# Patient Record
Sex: FEMALE | Race: WHITE | HISPANIC OR LATINO | Employment: FULL TIME | URBAN - METROPOLITAN AREA
[De-identification: names, ages, dates, MRNs, and addresses within clinical notes are randomized per-mention and may not be internally consistent; named-entity substitution may affect disease eponyms.]

---

## 2017-06-20 ENCOUNTER — OFFICE VISIT (OUTPATIENT)
Dept: URGENT CARE | Facility: PHYSICIAN GROUP | Age: 27
End: 2017-06-20
Payer: COMMERCIAL

## 2017-06-20 VITALS
HEART RATE: 78 BPM | BODY MASS INDEX: 23.98 KG/M2 | RESPIRATION RATE: 18 BRPM | SYSTOLIC BLOOD PRESSURE: 124 MMHG | WEIGHT: 127 LBS | OXYGEN SATURATION: 97 % | DIASTOLIC BLOOD PRESSURE: 74 MMHG | HEIGHT: 61 IN | TEMPERATURE: 98.7 F

## 2017-06-20 DIAGNOSIS — L02.91 ABSCESS: ICD-10-CM

## 2017-06-20 PROCEDURE — 99214 OFFICE O/P EST MOD 30 MIN: CPT | Performed by: FAMILY MEDICINE

## 2017-06-20 RX ORDER — SULFAMETHOXAZOLE AND TRIMETHOPRIM 800; 160 MG/1; MG/1
1 TABLET ORAL 2 TIMES DAILY
Qty: 20 TAB | Refills: 0 | Status: SHIPPED | OUTPATIENT
Start: 2017-06-20 | End: 2017-06-30

## 2017-06-20 RX ORDER — NORETHINDRONE ACETATE AND ETHINYL ESTRADIOL 1MG-20(21)
1 KIT ORAL
Refills: 3 | Status: ON HOLD | COMMUNITY
Start: 2017-04-04 | End: 2020-05-13

## 2017-06-20 ASSESSMENT — ENCOUNTER SYMPTOMS
BLURRED VISION: 0
FEVER: 0
EYE REDNESS: 0
EYE DISCHARGE: 0
NAUSEA: 0
VOMITING: 0

## 2017-06-20 NOTE — MR AVS SNAPSHOT
"        Kenya Estrada   2017 9:45 AM   Office Visit   MRN: [id number]    Department:  [facility]   Dept Phone:  [phone]    Description:  Female : 1990   Provider:  Jesus Berumen M.D.           Reason for Visit     Eye Problem C/o cyst under RT eye x2 wks.  PT states it feels solid, with no discharge.  PT was sick before noticing the cyst.      Allergies as of 2017     No Known Allergies      You were diagnosed with     Abscess   [[id number]]         Vital Signs     Blood Pressure Pulse Temperature Respirations Height Weight    124/74 mmHg 78 37.1 °C (98.7 °F) 18 1.549 m (5' 0.98\") 57.607 kg (127 lb)    Body Mass Index Oxygen Saturation Smoking Status             24.01 kg/m2 97% Never Smoker          Basic Information     Date Of Birth Sex Race Ethnicity Preferred Language    1990 Female Unable to Obtain Unknown English      Health Maintenance        Date Due Completion Dates    IMM DTaP/Tdap/Td Vaccine (1 - Tdap) 2007 ---    PAP SMEAR 2019            Current Immunizations     Influenza Vaccine Quad Inj (Pf) 10/15/2016  9:20 AM      Below and/or attached are the medications your provider expects you to take. Review all of your home medications and newly ordered medications with your provider and/or pharmacist. Follow medication instructions as directed by your provider and/or pharmacist. Please keep your medication list with you and share with your provider. Update the information when medications are discontinued, doses are changed, or new medications (including over-the-counter products) are added; and carry medication information at all times in the event of emergency situations     Allergies:  No Known Allergies          Medications  Valid as of: 2017 - 10:15 AM    Generic Name Brand Name Tablet Size Instructions for use    Albuterol Sulfate (Aero Soln) albuterol 108 (90 BASE) MCG/ACT Inhale 2 Puffs by mouth every four hours as needed for " Shortness of Breath.        Loratadine   Take  by mouth.        Norethin Ace-Eth Estrad-FE (Tab) BLISOVI FE 1/20 1-20 MG-MCG 1 Tab every day.        Promethazine-Codeine (Syrup) PHENERGAN-CODEINE 6.25-10 MG/5ML Take 5 mL by mouth 4 times a day as needed for Cough.        Pseudoeph-Doxylamine-DM-APAP   Take  by mouth.        Pseudoephedrine-APAP-DM   Take  by mouth.        Sulfamethoxazole-Trimethoprim (Tab) BACTRIM -160 MG Take 1 Tab by mouth 2 times a day for 10 days.        .                 Medicines prescribed today were sent to:     [facility][id number] - [place] - [address]    [address] [place] [address]    Phone: [phone] Fax: [phone]    Open 24 Hours?: No      Medication refill instructions:       If your prescription bottle indicates you have medication refills left, it is not necessary to call your provider’s office. Please contact your pharmacy and they will refill your medication.    If your prescription bottle indicates you do not have any refills left, you may request refills at any time through one of the following ways: The online ID [employer] system (except Urgent Care), by calling your provider’s office, or by asking your pharmacy to contact your provider’s office with a refill request. Medication refills are processed only during regular business hours and may not be available until the next business day. Your provider may request additional information or to have a follow-up visit with you prior to refilling your medication.   *Please Note: Medication refills are assigned a new Rx number when refilled electronically. Your pharmacy may indicate that no refills were authorized even though a new prescription for the same medication is available at the pharmacy. Please request the medicine by name with the pharmacy before contacting your provider for a refill.        Instructions    Abscess  An abscess is an infected area that contains a collection of pus and debris. It can occur in  almost any part of the body. An abscess is also known as a furuncle or boil.  CAUSES   An abscess occurs when tissue gets infected. This can occur from blockage of oil or sweat glands, infection of hair follicles, or a minor injury to the skin. As the body tries to fight the infection, pus collects in the area and creates pressure under the skin. This pressure causes pain. People with weakened immune systems have difficulty fighting infections and get certain abscesses more often.   SYMPTOMS  Usually an abscess develops on the skin and becomes a painful mass that is red, warm, and tender. If the abscess forms under the skin, you may feel a moveable soft area under the skin. Some abscesses break open (rupture) on their own, but most will continue to get worse without care. The infection can spread deeper into the body and eventually into the bloodstream, causing you to feel ill.   DIAGNOSIS   Your caregiver will take your medical history and perform a physical exam. A sample of fluid may also be taken from the abscess to determine what is causing your infection.  TREATMENT   Your caregiver may prescribe antibiotic medicines to fight the infection. However, taking antibiotics alone usually does not cure an abscess. Your caregiver may need to make a small cut (incision) in the abscess to drain the pus. In some cases, gauze is packed into the abscess to reduce pain and to continue draining the area.  HOME CARE INSTRUCTIONS   · Only take over-the-counter or prescription medicines for pain, discomfort, or fever as directed by your caregiver.  · If you were prescribed antibiotics, take them as directed. Finish them even if you start to feel better.  · If gauze is used, follow your caregiver's directions for changing the gauze.  · To avoid spreading the infection:  ¨ Keep your draining abscess covered with a bandage.  ¨ Wash your hands well.  ¨ Do not share personal care items, towels, or whirlpools with others.  ¨ Avoid  skin contact with others.  · Keep your skin and clothes clean around the abscess.  · Keep all follow-up appointments as directed by your caregiver.  SEEK MEDICAL CARE IF:   · You have increased pain, swelling, redness, fluid drainage, or bleeding.  · You have muscle aches, chills, or a general ill feeling.  · You have a fever.  MAKE SURE YOU:   · Understand these instructions.  · Will watch your condition.  · Will get help right away if you are not doing well or get worse.     This information is not intended to replace advice given to you by your health care provider. Make sure you discuss any questions you have with your health care provider.     Document Released: 09/27/2006 Document Revised: 06/18/2013 Document Reviewed: 03/01/2013  [employer] Interactive Patient Education ©2016 Elsevier Inc.            Wandrian Access Code: [email or web address]  Expires: 7/20/2017 10:15 AM    Wandrian  A secure, online tool to manage your health information     [employer]’s Wandrian® is a secure, online tool that connects you to your personalized health information from the privacy of your home -- day or night - making it very easy for you to manage your healthcare. Once the activation process is completed, you can even access your medical information using the Wandrian raj, which is available for free in the Apple Raj store or Google Play store.     Wandrian provides the following levels of access (as shown below):   My Chart Features   [facility]  Specialists [facility]  Urgent  Care Non-[facility]  Primary Care  Doctor   Email your healthcare team securely and privately 24/7 X X X    Manage appointments: schedule your next appointment; view details of past/upcoming appointments X      Request prescription refills. X      View recent personal medical records, including lab and immunizations X X X X   View health record, including health history, allergies, medications X X X X   Read reports about your outpatient  visits, procedures, consult and ER notes X X X X   See your discharge summary, which is a recap of your hospital and/or ER visit that includes your diagnosis, lab results, and care plan. X X       How to register for SimpleSite:  1. Go to  [email or web address].  2. Click on the Sign Up Now box, which takes you to the New Member Sign Up page. You will need to provide the following information:  a. Enter your SimpleSite Access Code exactly as it appears at the top of this page. (You will not need to use this code after you’ve completed the sign-up process. If you do not sign up before the expiration date, you must request a new code.)   b. Enter your date of birth.   c. Enter your home email address.   d. Click Submit, and follow the next screen’s instructions.  3. Create a SimpleSite ID. This will be your [employer] login ID and cannot be changed, so think of one that is secure and easy to remember.  4. Create a SimpleSite password. You can change your password at any time.  5. Enter your Password Reset Question and Answer. This can be used at a later time if you forget your password.   6. Enter your e-mail address. This allows you to receive e-mail notifications when new information is available in SimpleSite.  7. Click Sign Up. You can now view your health information.    For assistance activating your SimpleSite account, call [phone]

## 2017-06-20 NOTE — PROGRESS NOTES
"Subjective:      Kenya Estrada is a 27 y.o. female who presents with Eye Problem            Eye Problem   The left eye is affected. This is a new problem. The current episode started 1 to 4 weeks ago. The problem occurs constantly. The problem has been rapidly worsening. There was no injury mechanism. The pain is severe. Pertinent negatives include no blurred vision, eye discharge, eye redness, fever, itching, nausea or vomiting.       Review of Systems   Constitutional: Negative for fever.   Eyes: Negative for blurred vision, discharge and redness.   Gastrointestinal: Negative for nausea and vomiting.   Skin: Negative for itching.     No Known Allergies      Objective:     /74 mmHg  Pulse 78  Temp(Src) 37.1 °C (98.7 °F)  Resp 18  Ht 1.549 m (5' 0.98\")  Wt 57.607 kg (127 lb)  BMI 24.01 kg/m2  SpO2 97%     Physical Exam   Constitutional: She is oriented to person, place, and time. She appears well-developed and well-nourished. No distress.   HENT:   Head: Normocephalic and atraumatic.       Eyes: Conjunctivae and EOM are normal. Pupils are equal, round, and reactive to light.   Cardiovascular: Normal rate and regular rhythm.    No murmur heard.  Pulmonary/Chest: Effort normal and breath sounds normal. No respiratory distress.   Abdominal: Soft. She exhibits no distension. There is no tenderness.   Neurological: She is alert and oriented to person, place, and time. She has normal reflexes. No sensory deficit.   Skin: Skin is warm and dry.   Psychiatric: She has a normal mood and affect.               Assessment/Plan:     1. Abscess  Differential diagnosis, natural history, supportive care, and indications for immediate follow-up discussed.   - sulfamethoxazole-trimethoprim (BACTRIM DS) 800-160 MG tablet; Take 1 Tab by mouth 2 times a day for 10 days.  Dispense: 20 Tab; Refill: 0        "

## 2017-07-12 ENCOUNTER — HOSPITAL ENCOUNTER (OUTPATIENT)
Facility: MEDICAL CENTER | Age: 27
End: 2017-07-12
Attending: OBSTETRICS & GYNECOLOGY
Payer: COMMERCIAL

## 2017-07-12 LAB — CYTOLOGY REG CYTOL: NORMAL

## 2017-07-12 PROCEDURE — 88175 CYTOPATH C/V AUTO FLUID REDO: CPT

## 2017-09-16 ENCOUNTER — HOSPITAL ENCOUNTER (OUTPATIENT)
Facility: MEDICAL CENTER | Age: 27
End: 2017-09-16
Payer: COMMERCIAL

## 2017-09-16 LAB
ALBUMIN SERPL BCP-MCNC: 4.4 G/DL (ref 3.2–4.9)
ALBUMIN/GLOB SERPL: 1.3 G/DL
ALP SERPL-CCNC: 47 U/L (ref 30–99)
ALT SERPL-CCNC: 11 U/L (ref 2–50)
ANION GAP SERPL CALC-SCNC: 11 MMOL/L (ref 0–11.9)
AST SERPL-CCNC: 18 U/L (ref 12–45)
BDY FAT % MEASURED: 28.7 %
BILIRUB SERPL-MCNC: 0.3 MG/DL (ref 0.1–1.5)
BP DIAS: 70 MMHG
BP SYS: 118 MMHG
BUN SERPL-MCNC: 18 MG/DL (ref 8–22)
CALCIUM SERPL-MCNC: 9.4 MG/DL (ref 8.5–10.5)
CHLORIDE SERPL-SCNC: 104 MMOL/L (ref 96–112)
CHOLEST SERPL-MCNC: 208 MG/DL (ref 100–199)
CO2 SERPL-SCNC: 24 MMOL/L (ref 20–33)
CREAT SERPL-MCNC: 0.76 MG/DL (ref 0.5–1.4)
DIABETES HTDIA: NO
EVENT NAME HTEVT: NORMAL
GFR SERPL CREATININE-BSD FRML MDRD: >60 ML/MIN/1.73 M 2
GLOBULIN SER CALC-MCNC: 3.5 G/DL (ref 1.9–3.5)
GLUCOSE SERPL-MCNC: 94 MG/DL (ref 65–99)
HDLC SERPL-MCNC: 55 MG/DL
HYPERTENSION HTHYP: NO
LDLC SERPL CALC-MCNC: 131 MG/DL
POTASSIUM SERPL-SCNC: 4 MMOL/L (ref 3.6–5.5)
PROT SERPL-MCNC: 7.9 G/DL (ref 6–8.2)
SCREENING LOC CITY HTCIT: NORMAL
SCREENING LOC STATE HTSTA: NORMAL
SCREENING LOCATION HTLOC: NORMAL
SODIUM SERPL-SCNC: 139 MMOL/L (ref 135–145)
SUBSCRIBER ID HTSID: NORMAL
TRIGL SERPL-MCNC: 111 MG/DL (ref 0–149)

## 2017-12-28 ENCOUNTER — OFFICE VISIT (OUTPATIENT)
Dept: INTERNAL MEDICINE | Facility: MEDICAL CENTER | Age: 27
End: 2017-12-28
Payer: COMMERCIAL

## 2017-12-28 VITALS
HEIGHT: 60 IN | TEMPERATURE: 98.3 F | BODY MASS INDEX: 24.76 KG/M2 | HEART RATE: 71 BPM | WEIGHT: 126.13 LBS | DIASTOLIC BLOOD PRESSURE: 78 MMHG | OXYGEN SATURATION: 100 % | SYSTOLIC BLOOD PRESSURE: 122 MMHG

## 2017-12-28 DIAGNOSIS — Z23 NEED FOR INFLUENZA VACCINATION: ICD-10-CM

## 2017-12-28 DIAGNOSIS — Z82.49 FAMILY HISTORY OF ISCHEMIC HEART DISEASE (IHD): ICD-10-CM

## 2017-12-28 DIAGNOSIS — Z00.00 HEALTH CARE MAINTENANCE: ICD-10-CM

## 2017-12-28 DIAGNOSIS — Z23 NEED FOR TD VACCINE: ICD-10-CM

## 2017-12-28 DIAGNOSIS — E78.5 DYSLIPIDEMIA: ICD-10-CM

## 2017-12-28 PROCEDURE — 99203 OFFICE O/P NEW LOW 30 MIN: CPT | Mod: 25,GC | Performed by: INTERNAL MEDICINE

## 2017-12-28 PROCEDURE — 90472 IMMUNIZATION ADMIN EACH ADD: CPT | Performed by: INTERNAL MEDICINE

## 2017-12-28 PROCEDURE — 90714 TD VACC NO PRESV 7 YRS+ IM: CPT | Performed by: INTERNAL MEDICINE

## 2017-12-28 PROCEDURE — 90471 IMMUNIZATION ADMIN: CPT | Performed by: INTERNAL MEDICINE

## 2017-12-28 PROCEDURE — 90686 IIV4 VACC NO PRSV 0.5 ML IM: CPT | Performed by: INTERNAL MEDICINE

## 2017-12-28 ASSESSMENT — PATIENT HEALTH QUESTIONNAIRE - PHQ9: CLINICAL INTERPRETATION OF PHQ2 SCORE: 0

## 2017-12-28 NOTE — PROGRESS NOTES
New Patient to Establish    Reason to establish: For vaccination    CC: Need for vaccination,    HPI:     27-year-old female with no significant past medical history except elevated LDL, intermittent cough, currently not on any medication except oral contraceptive pills came in today for vaccination for tetanus. She is a teacher and teaches grade 1 students and due to exposure t intermittently gets cough. Currently she denies any cough fever or shortness of breath. She has an episode of otitis media in 2016 and an episode of cellulitis in 2017. Currently she denies any symptoms. She is taking her oral contraceptive pills and is up-to-date with STD screening and complies with was 40 290 safe sexual practices.         Patient Active Problem List    Diagnosis Date Noted   • Dyslipidemia 12/28/2017   • Need for influenza vaccination 12/28/2017   • Need for tetanus booster 12/28/2017   • Need for TD vaccine 12/28/2017   • Health care maintenance 12/28/2017       No past medical history on file.    Current Outpatient Prescriptions   Medication Sig Dispense Refill   • BLISOVI FE 1/20 1-20 MG-MCG per tablet 1 Tab every day.  3     No current facility-administered medications for this visit.        Allergies as of 12/28/2017   • (No Known Allergies)       Social History     Social History   • Marital status: Unknown     Spouse name: N/A   • Number of children: N/A   • Years of education: N/A     Occupational History   • Not on file.     Social History Main Topics   • Smoking status: Never Smoker   • Smokeless tobacco: Not on file   • Alcohol use Not on file   • Drug use: Unknown   • Sexual activity: Not on file     Other Topics Concern   • Not on file     Social History Narrative   • No narrative on file     Smoking: Denies  Alcohol: Social  Drugs: Denies    Family history: Mother was diagnosed with meningioma, paternal grandfather had ischemic heart disease at age of 40    No past surgical history on file.    ROS: As per  "HPI. Additional pertinent symptoms as noted below.  General;Feels well, with stable,  GI: Denies nausea vomiting and abdominal discomfort  Genitourinary: Denies dysuria, hematuria, suprapubic discomfort  Neurological: Denies any focal weakness or sensory loss  Musculoskeletal: No arthritis, swelling or back pain  Lungs: Positive for chronic cough, not interested in quitting smoking, no fever  Psychiatric: Denies any suicidal or homicidal ideation   Cardiovascular: No chest pain orthopnea or PND  OB/GYN: Cycles regular, on contraceptive pills, no discharge dyspareunia or menorrhagia            /78   Pulse 71   Temp 36.8 °C (98.3 °F)   Ht 1.53 m (5' 0.25\")   Wt 57.2 kg (126 lb 2 oz)   SpO2 100%   BMI 24.43 kg/m²     Physical Exam  General:  Alert and oriented, No apparent distress.    Eyes: Pupils equal and reactive. No scleral icterus.    Throat: Clear no erythema or exudates noted.    Neck: Supple. No lymphadenopathy noted. Thyroid not enlarged.    Lungs: Clear to auscultation and percussion bilaterally.    Cardiovascular: Regular rate and rhythm. No murmurs, rubs or gallops.    Abdomen:  Benign. No rebound or guarding noted.    Extremities: No clubbing, cyanosis, edema.    Skin: Clear. No rash or suspicious skin lesions noted.      Note: I have reviewed all pertinent labs and diagnostic tests associated with this visit with specific comments listed under the assessment and plan below    Assessment and Plan    Dyslipidemia  Need for influenza vaccination  Need for TD vaccine  Health care maintenance  Family history of ischemic heart disease in young age    Will give tetanus booster and flu shot today  Due to persistent elevated LDL and family history of ischemic heart disease in young age at 40 and paternal grandfather: Will check glycohemoglobin  Educated about diet and exercise  Educated about safe sexual practices and STD prevention    Follow-up in one year    Followup: No Follow-up on file.    Risk " Assessment (discuss potential complications a function of chronic problems): yes    Complexity (discuss number of co-morbidities): 3    Signed by: Rob Olivares M.D.

## 2017-12-28 NOTE — NON-PROVIDER
"Kenya Estrada is a 27 y.o. female here for a non-provider visit for:   FLU    Reason for immunization: Overdue/Provider Recommended  Immunization records indicate need for vaccine: Yes, confirmed with Epic  Minimum interval has been met for this vaccine: Yes  ABN completed: Not Indicated    Order and dose verified by: Narda  VIS Dated  08/07/15 was given to patient: Yes  All IAC Questionnaire questions were answered \"No.\"    Patient tolerated injection and no adverse effects were observed or reported: Yes    Pt scheduled for next dose in series: Not Indicated    Kenya Estrada is a 27 y.o. female here for a non-provider visit for:   TD    Reason for immunization: Overdue/Provider Recommended  Immunization records indicate need for vaccine: Yes, confirmed with Epic  Minimum interval has been met for this vaccine: Yes  ABN completed: Not Indicated    Order and dose verified by: Narda  VIS Dated  04/11/17 was given to patient: Yes  All IAC Questionnaire questions were answered \"No.\"    Patient tolerated injection and no adverse effects were observed or reported: Yes    Pt scheduled for next dose in series: Not Indicated      "

## 2018-09-08 ENCOUNTER — HOSPITAL ENCOUNTER (OUTPATIENT)
Facility: MEDICAL CENTER | Age: 28
End: 2018-09-08
Payer: COMMERCIAL

## 2018-09-08 LAB
BDY FAT % MEASURED: 33.8 %
BP DIAS: 64 MMHG
BP SYS: 110 MMHG
DIABETES HTDIA: NO
EVENT NAME HTEVT: NORMAL
HYPERTENSION HTHYP: NO
SCREENING LOC CITY HTCIT: NORMAL
SCREENING LOC STATE HTSTA: NORMAL
SCREENING LOCATION HTLOC: NORMAL
SUBSCRIBER ID HTSID: NORMAL

## 2018-09-09 LAB
CHOLEST SERPL-MCNC: 234 MG/DL (ref 100–199)
FASTING STATUS PATIENT QL REPORTED: NORMAL
GLUCOSE SERPL-MCNC: 89 MG/DL (ref 65–99)
HDLC SERPL-MCNC: 55 MG/DL
LDLC SERPL CALC-MCNC: 148 MG/DL
TRIGL SERPL-MCNC: 157 MG/DL (ref 0–149)

## 2018-09-20 ENCOUNTER — HOSPITAL ENCOUNTER (OUTPATIENT)
Dept: LAB | Facility: MEDICAL CENTER | Age: 28
End: 2018-09-20
Attending: OBSTETRICS & GYNECOLOGY
Payer: COMMERCIAL

## 2018-09-20 PROCEDURE — 88175 CYTOPATH C/V AUTO FLUID REDO: CPT

## 2018-09-20 PROCEDURE — 87624 HPV HI-RISK TYP POOLED RSLT: CPT

## 2018-09-24 LAB
CYTOLOGY REG CYTOL: NORMAL
HPV HR 12 DNA CVX QL NAA+PROBE: NEGATIVE
HPV16 DNA SPEC QL NAA+PROBE: NEGATIVE
HPV18 DNA SPEC QL NAA+PROBE: NEGATIVE
SPECIMEN SOURCE: NORMAL

## 2019-09-13 ENCOUNTER — HOSPITAL ENCOUNTER (OUTPATIENT)
Dept: LAB | Facility: MEDICAL CENTER | Age: 29
End: 2019-09-13
Attending: OBSTETRICS & GYNECOLOGY
Payer: COMMERCIAL

## 2019-09-13 LAB
ABO GROUP BLD: NORMAL
B-HCG SERPL-ACNC: 1045.6 MIU/ML (ref 0–5)
BASOPHILS # BLD AUTO: 0.3 % (ref 0–1.8)
BASOPHILS # BLD: 0.03 K/UL (ref 0–0.12)
EOSINOPHIL # BLD AUTO: 0.06 K/UL (ref 0–0.51)
EOSINOPHIL NFR BLD: 0.6 % (ref 0–6.9)
ERYTHROCYTE [DISTWIDTH] IN BLOOD BY AUTOMATED COUNT: 42.8 FL (ref 35.9–50)
HCT VFR BLD AUTO: 41.2 % (ref 37–47)
HGB BLD-MCNC: 13.5 G/DL (ref 12–16)
IMM GRANULOCYTES # BLD AUTO: 0.03 K/UL (ref 0–0.11)
IMM GRANULOCYTES NFR BLD AUTO: 0.3 % (ref 0–0.9)
LYMPHOCYTES # BLD AUTO: 2.85 K/UL (ref 1–4.8)
LYMPHOCYTES NFR BLD: 29.2 % (ref 22–41)
MCH RBC QN AUTO: 29.6 PG (ref 27–33)
MCHC RBC AUTO-ENTMCNC: 32.8 G/DL (ref 33.6–35)
MCV RBC AUTO: 90.4 FL (ref 81.4–97.8)
MONOCYTES # BLD AUTO: 0.51 K/UL (ref 0–0.85)
MONOCYTES NFR BLD AUTO: 5.2 % (ref 0–13.4)
NEUTROPHILS # BLD AUTO: 6.27 K/UL (ref 2–7.15)
NEUTROPHILS NFR BLD: 64.4 % (ref 44–72)
NRBC # BLD AUTO: 0 K/UL
NRBC BLD-RTO: 0 /100 WBC
PLATELET # BLD AUTO: 244 K/UL (ref 164–446)
PMV BLD AUTO: 11 FL (ref 9–12.9)
PROGEST SERPL-MCNC: 24.14 NG/ML
RBC # BLD AUTO: 4.56 M/UL (ref 4.2–5.4)
RH BLD: NORMAL
WBC # BLD AUTO: 9.8 K/UL (ref 4.8–10.8)

## 2019-09-13 PROCEDURE — 85025 COMPLETE CBC W/AUTO DIFF WBC: CPT

## 2019-09-13 PROCEDURE — 36415 COLL VENOUS BLD VENIPUNCTURE: CPT

## 2019-09-13 PROCEDURE — 84144 ASSAY OF PROGESTERONE: CPT

## 2019-09-13 PROCEDURE — 86901 BLOOD TYPING SEROLOGIC RH(D): CPT

## 2019-09-13 PROCEDURE — 84702 CHORIONIC GONADOTROPIN TEST: CPT

## 2019-09-13 PROCEDURE — 86900 BLOOD TYPING SEROLOGIC ABO: CPT

## 2019-09-20 ENCOUNTER — HOSPITAL ENCOUNTER (OUTPATIENT)
Dept: LAB | Facility: MEDICAL CENTER | Age: 29
End: 2019-09-20
Attending: OBSTETRICS & GYNECOLOGY
Payer: COMMERCIAL

## 2019-09-20 LAB — B-HCG SERPL-ACNC: ABNORMAL MIU/ML (ref 0–5)

## 2019-09-20 PROCEDURE — 36415 COLL VENOUS BLD VENIPUNCTURE: CPT

## 2019-09-20 PROCEDURE — 84702 CHORIONIC GONADOTROPIN TEST: CPT

## 2019-10-29 ENCOUNTER — HOSPITAL ENCOUNTER (OUTPATIENT)
Dept: LAB | Facility: MEDICAL CENTER | Age: 29
End: 2019-10-29
Attending: OBSTETRICS & GYNECOLOGY
Payer: COMMERCIAL

## 2019-10-29 PROCEDURE — 87491 CHLMYD TRACH DNA AMP PROBE: CPT

## 2019-10-29 PROCEDURE — 88175 CYTOPATH C/V AUTO FLUID REDO: CPT

## 2019-10-29 PROCEDURE — 87591 N.GONORRHOEAE DNA AMP PROB: CPT

## 2019-10-29 PROCEDURE — 87624 HPV HI-RISK TYP POOLED RSLT: CPT

## 2019-10-31 LAB — AMBIGUOUS DTTM AMBI4: NORMAL

## 2019-11-01 ENCOUNTER — HOSPITAL ENCOUNTER (OUTPATIENT)
Dept: LAB | Facility: MEDICAL CENTER | Age: 29
End: 2019-11-01
Attending: OBSTETRICS & GYNECOLOGY
Payer: COMMERCIAL

## 2019-11-01 LAB
ABO GROUP BLD: NORMAL
APPEARANCE UR: ABNORMAL
BACTERIA #/AREA URNS HPF: ABNORMAL /HPF
BASOPHILS # BLD AUTO: 0.2 % (ref 0–1.8)
BASOPHILS # BLD: 0.02 K/UL (ref 0–0.12)
BILIRUB UR QL STRIP.AUTO: NEGATIVE
BLD GP AB SCN SERPL QL: NORMAL
C TRACH DNA GENITAL QL NAA+PROBE: NEGATIVE
COLOR UR: YELLOW
CYTOLOGY REG CYTOL: NORMAL
EOSINOPHIL # BLD AUTO: 0.1 K/UL (ref 0–0.51)
EOSINOPHIL NFR BLD: 1 % (ref 0–6.9)
EPI CELLS #/AREA URNS HPF: ABNORMAL /HPF
ERYTHROCYTE [DISTWIDTH] IN BLOOD BY AUTOMATED COUNT: 39.8 FL (ref 35.9–50)
GLUCOSE UR STRIP.AUTO-MCNC: NEGATIVE MG/DL
HBV SURFACE AG SER QL: NEGATIVE
HCT VFR BLD AUTO: 40.1 % (ref 37–47)
HCV AB SER QL: NEGATIVE
HGB BLD-MCNC: 13.7 G/DL (ref 12–16)
HIV 1+2 AB+HIV1 P24 AG SERPL QL IA: NON REACTIVE
HPV HR 12 DNA CVX QL NAA+PROBE: NEGATIVE
HPV16 DNA SPEC QL NAA+PROBE: NEGATIVE
HPV18 DNA SPEC QL NAA+PROBE: NEGATIVE
HYALINE CASTS #/AREA URNS LPF: ABNORMAL /LPF
IMM GRANULOCYTES # BLD AUTO: 0.07 K/UL (ref 0–0.11)
IMM GRANULOCYTES NFR BLD AUTO: 0.7 % (ref 0–0.9)
KETONES UR STRIP.AUTO-MCNC: NEGATIVE MG/DL
LEUKOCYTE ESTERASE UR QL STRIP.AUTO: ABNORMAL
LYMPHOCYTES # BLD AUTO: 2.87 K/UL (ref 1–4.8)
LYMPHOCYTES NFR BLD: 29.3 % (ref 22–41)
MCH RBC QN AUTO: 30.4 PG (ref 27–33)
MCHC RBC AUTO-ENTMCNC: 34.2 G/DL (ref 33.6–35)
MCV RBC AUTO: 89.1 FL (ref 81.4–97.8)
MICRO URNS: ABNORMAL
MONOCYTES # BLD AUTO: 0.53 K/UL (ref 0–0.85)
MONOCYTES NFR BLD AUTO: 5.4 % (ref 0–13.4)
MUCOUS THREADS #/AREA URNS HPF: ABNORMAL /HPF
N GONORRHOEA DNA GENITAL QL NAA+PROBE: NEGATIVE
NEUTROPHILS # BLD AUTO: 6.22 K/UL (ref 2–7.15)
NEUTROPHILS NFR BLD: 63.4 % (ref 44–72)
NITRITE UR QL STRIP.AUTO: NEGATIVE
NRBC # BLD AUTO: 0 K/UL
NRBC BLD-RTO: 0 /100 WBC
PH UR STRIP.AUTO: 6 [PH] (ref 5–8)
PLATELET # BLD AUTO: 284 K/UL (ref 164–446)
PMV BLD AUTO: 10.5 FL (ref 9–12.9)
PROT UR QL STRIP: NEGATIVE MG/DL
RBC # BLD AUTO: 4.5 M/UL (ref 4.2–5.4)
RBC # URNS HPF: ABNORMAL /HPF
RBC UR QL AUTO: NEGATIVE
RENAL EPI CELLS #/AREA URNS HPF: NEGATIVE /HPF
RH BLD: NORMAL
RUBV AB SER QL: 6.3 IU/ML
SP GR UR STRIP.AUTO: 1.02
SPECIMEN SOURCE: NORMAL
SPECIMEN SOURCE: NORMAL
TRANS CELLS #/AREA URNS HPF: NEGATIVE /HPF
TREPONEMA PALLIDUM IGG+IGM AB [PRESENCE] IN SERUM OR PLASMA BY IMMUNOASSAY: NON REACTIVE
UROBILINOGEN UR STRIP.AUTO-MCNC: 0.2 MG/DL
WBC # BLD AUTO: 9.8 K/UL (ref 4.8–10.8)
WBC #/AREA URNS HPF: ABNORMAL /HPF

## 2019-11-01 PROCEDURE — 81244 FMR1 GEN ALYS CHARAC ALLELES: CPT

## 2019-11-01 PROCEDURE — 86900 BLOOD TYPING SEROLOGIC ABO: CPT

## 2019-11-01 PROCEDURE — 81243 FMR1 GEN ALY DETC ABNL ALLEL: CPT

## 2019-11-01 PROCEDURE — 86901 BLOOD TYPING SEROLOGIC RH(D): CPT

## 2019-11-01 PROCEDURE — 87340 HEPATITIS B SURFACE AG IA: CPT

## 2019-11-01 PROCEDURE — 81001 URINALYSIS AUTO W/SCOPE: CPT

## 2019-11-01 PROCEDURE — 86762 RUBELLA ANTIBODY: CPT

## 2019-11-01 PROCEDURE — 81220 CFTR GENE COM VARIANTS: CPT

## 2019-11-01 PROCEDURE — 85025 COMPLETE CBC W/AUTO DIFF WBC: CPT

## 2019-11-01 PROCEDURE — 36415 COLL VENOUS BLD VENIPUNCTURE: CPT

## 2019-11-01 PROCEDURE — 86780 TREPONEMA PALLIDUM: CPT

## 2019-11-01 PROCEDURE — 87389 HIV-1 AG W/HIV-1&-2 AB AG IA: CPT

## 2019-11-01 PROCEDURE — 86803 HEPATITIS C AB TEST: CPT

## 2019-11-01 PROCEDURE — 86850 RBC ANTIBODY SCREEN: CPT

## 2019-11-06 LAB
CF EXPANDED VARIANT PANEL INTERP Q4864: NORMAL
CFTR ALLELE 1 BLD/T QL: NEGATIVE
CFTR ALLELE 1 BLD/T QL: NEGATIVE
CFTR MUT ANL BLD/T: NORMAL
FMR1 ALLELE 2 CGG RPT ENTNUM BLD/T: 20 CGG REPEATS
FMR1 ALLELE1 CGG RPT ENTNUM BLD/T: 29 CGG REPEATS
FMR1 GENE MUT ANL BLD/T: NORMAL
FMR1 GENE MUT ANL BLD/T: NORMAL

## 2020-02-11 ENCOUNTER — APPOINTMENT (OUTPATIENT)
Dept: OTHER | Facility: IMAGING CENTER | Age: 30
End: 2020-02-11

## 2020-02-15 ENCOUNTER — HOSPITAL ENCOUNTER (OUTPATIENT)
Dept: LAB | Facility: MEDICAL CENTER | Age: 30
End: 2020-02-15
Attending: OBSTETRICS & GYNECOLOGY
Payer: COMMERCIAL

## 2020-02-15 LAB
BASOPHILS # BLD AUTO: 0.6 % (ref 0–1.8)
BASOPHILS # BLD: 0.06 K/UL (ref 0–0.12)
EOSINOPHIL # BLD AUTO: 0.03 K/UL (ref 0–0.51)
EOSINOPHIL NFR BLD: 0.3 % (ref 0–6.9)
ERYTHROCYTE [DISTWIDTH] IN BLOOD BY AUTOMATED COUNT: 46.5 FL (ref 35.9–50)
GLUCOSE 1H P 50 G GLC PO SERPL-MCNC: 136 MG/DL (ref 70–139)
HCT VFR BLD AUTO: 39.8 % (ref 37–47)
HGB BLD-MCNC: 13.1 G/DL (ref 12–16)
IMM GRANULOCYTES # BLD AUTO: 0.11 K/UL (ref 0–0.11)
IMM GRANULOCYTES NFR BLD AUTO: 1.1 % (ref 0–0.9)
LYMPHOCYTES # BLD AUTO: 1.78 K/UL (ref 1–4.8)
LYMPHOCYTES NFR BLD: 17.3 % (ref 22–41)
MCH RBC QN AUTO: 31.5 PG (ref 27–33)
MCHC RBC AUTO-ENTMCNC: 32.9 G/DL (ref 33.6–35)
MCV RBC AUTO: 95.7 FL (ref 81.4–97.8)
MONOCYTES # BLD AUTO: 0.34 K/UL (ref 0–0.85)
MONOCYTES NFR BLD AUTO: 3.3 % (ref 0–13.4)
NEUTROPHILS # BLD AUTO: 7.98 K/UL (ref 2–7.15)
NEUTROPHILS NFR BLD: 77.4 % (ref 44–72)
NRBC # BLD AUTO: 0 K/UL
NRBC BLD-RTO: 0 /100 WBC
PLATELET # BLD AUTO: 230 K/UL (ref 164–446)
PMV BLD AUTO: 11 FL (ref 9–12.9)
RBC # BLD AUTO: 4.16 M/UL (ref 4.2–5.4)
TREPONEMA PALLIDUM IGG+IGM AB [PRESENCE] IN SERUM OR PLASMA BY IMMUNOASSAY: NON REACTIVE
WBC # BLD AUTO: 10.3 K/UL (ref 4.8–10.8)

## 2020-02-15 PROCEDURE — 36415 COLL VENOUS BLD VENIPUNCTURE: CPT

## 2020-02-15 PROCEDURE — 86780 TREPONEMA PALLIDUM: CPT

## 2020-02-15 PROCEDURE — 82950 GLUCOSE TEST: CPT

## 2020-02-15 PROCEDURE — 85025 COMPLETE CBC W/AUTO DIFF WBC: CPT

## 2020-02-18 ENCOUNTER — APPOINTMENT (OUTPATIENT)
Dept: OTHER | Facility: IMAGING CENTER | Age: 30
End: 2020-02-18

## 2020-02-25 ENCOUNTER — APPOINTMENT (OUTPATIENT)
Dept: OTHER | Facility: IMAGING CENTER | Age: 30
End: 2020-02-25

## 2020-03-03 ENCOUNTER — APPOINTMENT (OUTPATIENT)
Dept: OTHER | Facility: IMAGING CENTER | Age: 30
End: 2020-03-03

## 2020-03-10 ENCOUNTER — APPOINTMENT (OUTPATIENT)
Dept: OTHER | Facility: IMAGING CENTER | Age: 30
End: 2020-03-10
Payer: COMMERCIAL

## 2020-04-22 LAB — STREP GP B DNA PCR: NEGATIVE

## 2020-05-13 ENCOUNTER — HOSPITAL ENCOUNTER (INPATIENT)
Facility: MEDICAL CENTER | Age: 30
LOS: 2 days | End: 2020-05-15
Attending: OBSTETRICS & GYNECOLOGY | Admitting: OBSTETRICS & GYNECOLOGY
Payer: COMMERCIAL

## 2020-05-13 ENCOUNTER — ANESTHESIA EVENT (OUTPATIENT)
Dept: ANESTHESIOLOGY | Facility: MEDICAL CENTER | Age: 30
End: 2020-05-13
Payer: COMMERCIAL

## 2020-05-13 ENCOUNTER — ANESTHESIA (OUTPATIENT)
Dept: ANESTHESIOLOGY | Facility: MEDICAL CENTER | Age: 30
End: 2020-05-13
Payer: COMMERCIAL

## 2020-05-13 ENCOUNTER — APPOINTMENT (OUTPATIENT)
Dept: OBGYN | Facility: MEDICAL CENTER | Age: 30
End: 2020-05-13
Attending: OBSTETRICS & GYNECOLOGY
Payer: COMMERCIAL

## 2020-05-13 LAB
BASOPHILS # BLD AUTO: 0.3 % (ref 0–1.8)
BASOPHILS # BLD: 0.03 K/UL (ref 0–0.12)
COVID ORDER STATUS COVID19: NORMAL
EOSINOPHIL # BLD AUTO: 0.02 K/UL (ref 0–0.51)
EOSINOPHIL NFR BLD: 0.2 % (ref 0–6.9)
ERYTHROCYTE [DISTWIDTH] IN BLOOD BY AUTOMATED COUNT: 46.1 FL (ref 35.9–50)
HCT VFR BLD AUTO: 39 % (ref 37–47)
HGB BLD-MCNC: 13.4 G/DL (ref 12–16)
HOLDING TUBE BB 8507: NORMAL
IMM GRANULOCYTES # BLD AUTO: 0.09 K/UL (ref 0–0.11)
IMM GRANULOCYTES NFR BLD AUTO: 0.9 % (ref 0–0.9)
LYMPHOCYTES # BLD AUTO: 1.67 K/UL (ref 1–4.8)
LYMPHOCYTES NFR BLD: 16.4 % (ref 22–41)
MCH RBC QN AUTO: 30.2 PG (ref 27–33)
MCHC RBC AUTO-ENTMCNC: 34.4 G/DL (ref 33.6–35)
MCV RBC AUTO: 88 FL (ref 81.4–97.8)
MONOCYTES # BLD AUTO: 0.73 K/UL (ref 0–0.85)
MONOCYTES NFR BLD AUTO: 7.2 % (ref 0–13.4)
NEUTROPHILS # BLD AUTO: 7.65 K/UL (ref 2–7.15)
NEUTROPHILS NFR BLD: 75 % (ref 44–72)
NRBC # BLD AUTO: 0 K/UL
NRBC BLD-RTO: 0 /100 WBC
PLATELET # BLD AUTO: 205 K/UL (ref 164–446)
PMV BLD AUTO: 11.3 FL (ref 9–12.9)
RBC # BLD AUTO: 4.43 M/UL (ref 4.2–5.4)
SARS-COV-2 RNA RESP QL NAA+PROBE: NOTDETECTED
SPECIMEN SOURCE: NORMAL
WBC # BLD AUTO: 10.2 K/UL (ref 4.8–10.8)

## 2020-05-13 PROCEDURE — U0004 COV-19 TEST NON-CDC HGH THRU: HCPCS

## 2020-05-13 PROCEDURE — 700105 HCHG RX REV CODE 258: Performed by: OBSTETRICS & GYNECOLOGY

## 2020-05-13 PROCEDURE — 0KQM0ZZ REPAIR PERINEUM MUSCLE, OPEN APPROACH: ICD-10-PCS | Performed by: OBSTETRICS & GYNECOLOGY

## 2020-05-13 PROCEDURE — 59409 OBSTETRICAL CARE: CPT

## 2020-05-13 PROCEDURE — G2023 SPECIMEN COLLECT COVID-19: HCPCS | Performed by: OBSTETRICS & GYNECOLOGY

## 2020-05-13 PROCEDURE — 88307 TISSUE EXAM BY PATHOLOGIST: CPT

## 2020-05-13 PROCEDURE — 10907ZC DRAINAGE OF AMNIOTIC FLUID, THERAPEUTIC FROM PRODUCTS OF CONCEPTION, VIA NATURAL OR ARTIFICIAL OPENING: ICD-10-PCS | Performed by: OBSTETRICS & GYNECOLOGY

## 2020-05-13 PROCEDURE — 700101 HCHG RX REV CODE 250: Performed by: ANESTHESIOLOGY

## 2020-05-13 PROCEDURE — A9270 NON-COVERED ITEM OR SERVICE: HCPCS

## 2020-05-13 PROCEDURE — 36415 COLL VENOUS BLD VENIPUNCTURE: CPT

## 2020-05-13 PROCEDURE — 770002 HCHG ROOM/CARE - OB PRIVATE (112)

## 2020-05-13 PROCEDURE — 304965 HCHG RECOVERY SERVICES

## 2020-05-13 PROCEDURE — 700102 HCHG RX REV CODE 250 W/ 637 OVERRIDE(OP)

## 2020-05-13 PROCEDURE — 3E033VJ INTRODUCTION OF OTHER HORMONE INTO PERIPHERAL VEIN, PERCUTANEOUS APPROACH: ICD-10-PCS | Performed by: OBSTETRICS & GYNECOLOGY

## 2020-05-13 PROCEDURE — 700111 HCHG RX REV CODE 636 W/ 250 OVERRIDE (IP)

## 2020-05-13 PROCEDURE — 85025 COMPLETE CBC W/AUTO DIFF WBC: CPT

## 2020-05-13 PROCEDURE — 0UC97ZZ EXTIRPATION OF MATTER FROM UTERUS, VIA NATURAL OR ARTIFICIAL OPENING: ICD-10-PCS | Performed by: OBSTETRICS & GYNECOLOGY

## 2020-05-13 PROCEDURE — 700111 HCHG RX REV CODE 636 W/ 250 OVERRIDE (IP): Performed by: OBSTETRICS & GYNECOLOGY

## 2020-05-13 PROCEDURE — 10H07YZ INSERTION OF OTHER DEVICE INTO PRODUCTS OF CONCEPTION, VIA NATURAL OR ARTIFICIAL OPENING: ICD-10-PCS | Performed by: OBSTETRICS & GYNECOLOGY

## 2020-05-13 RX ORDER — METOCLOPRAMIDE HYDROCHLORIDE 5 MG/ML
INJECTION INTRAMUSCULAR; INTRAVENOUS
Status: COMPLETED
Start: 2020-05-13 | End: 2020-05-13

## 2020-05-13 RX ORDER — CITRIC ACID/SODIUM CITRATE 334-500MG
30 SOLUTION, ORAL ORAL ONCE
Status: DISCONTINUED | OUTPATIENT
Start: 2020-05-13 | End: 2020-05-14 | Stop reason: HOSPADM

## 2020-05-13 RX ORDER — MISOPROSTOL 200 UG/1
1000 TABLET ORAL
Status: COMPLETED | OUTPATIENT
Start: 2020-05-13 | End: 2020-05-13

## 2020-05-13 RX ORDER — OXYCODONE AND ACETAMINOPHEN 10; 325 MG/1; MG/1
1 TABLET ORAL EVERY 4 HOURS PRN
Status: DISCONTINUED | OUTPATIENT
Start: 2020-05-13 | End: 2020-05-15 | Stop reason: HOSPADM

## 2020-05-13 RX ORDER — ROPIVACAINE HYDROCHLORIDE 2 MG/ML
INJECTION, SOLUTION EPIDURAL; INFILTRATION; PERINEURAL
Status: COMPLETED
Start: 2020-05-13 | End: 2020-05-13

## 2020-05-13 RX ORDER — ONDANSETRON 2 MG/ML
4 INJECTION INTRAMUSCULAR; INTRAVENOUS EVERY 6 HOURS PRN
Status: DISCONTINUED | OUTPATIENT
Start: 2020-05-13 | End: 2020-05-15 | Stop reason: HOSPADM

## 2020-05-13 RX ORDER — OXYCODONE HYDROCHLORIDE AND ACETAMINOPHEN 5; 325 MG/1; MG/1
1 TABLET ORAL EVERY 4 HOURS PRN
Status: DISCONTINUED | OUTPATIENT
Start: 2020-05-13 | End: 2020-05-15 | Stop reason: HOSPADM

## 2020-05-13 RX ORDER — SODIUM CHLORIDE, SODIUM LACTATE, POTASSIUM CHLORIDE, CALCIUM CHLORIDE 600; 310; 30; 20 MG/100ML; MG/100ML; MG/100ML; MG/100ML
INJECTION, SOLUTION INTRAVENOUS CONTINUOUS
Status: DISPENSED | OUTPATIENT
Start: 2020-05-13 | End: 2020-05-13

## 2020-05-13 RX ORDER — ROPIVACAINE HYDROCHLORIDE 2 MG/ML
INJECTION, SOLUTION EPIDURAL; INFILTRATION; PERINEURAL CONTINUOUS
Status: DISCONTINUED | OUTPATIENT
Start: 2020-05-13 | End: 2020-05-15 | Stop reason: HOSPADM

## 2020-05-13 RX ORDER — MISOPROSTOL 200 UG/1
TABLET ORAL
Status: COMPLETED
Start: 2020-05-13 | End: 2020-05-13

## 2020-05-13 RX ORDER — CITRIC ACID/SODIUM CITRATE 334-500MG
SOLUTION, ORAL ORAL
Status: COMPLETED
Start: 2020-05-13 | End: 2020-05-13

## 2020-05-13 RX ORDER — SODIUM CHLORIDE, SODIUM LACTATE, POTASSIUM CHLORIDE, AND CALCIUM CHLORIDE .6; .31; .03; .02 G/100ML; G/100ML; G/100ML; G/100ML
250 INJECTION, SOLUTION INTRAVENOUS PRN
Status: DISCONTINUED | OUTPATIENT
Start: 2020-05-13 | End: 2020-05-14 | Stop reason: HOSPADM

## 2020-05-13 RX ORDER — SODIUM CHLORIDE, SODIUM LACTATE, POTASSIUM CHLORIDE, AND CALCIUM CHLORIDE .6; .31; .03; .02 G/100ML; G/100ML; G/100ML; G/100ML
1000 INJECTION, SOLUTION INTRAVENOUS
Status: DISCONTINUED | OUTPATIENT
Start: 2020-05-13 | End: 2020-05-14 | Stop reason: HOSPADM

## 2020-05-13 RX ORDER — TERBUTALINE SULFATE 1 MG/ML
INJECTION, SOLUTION SUBCUTANEOUS
Status: COMPLETED
Start: 2020-05-13 | End: 2020-05-13

## 2020-05-13 RX ORDER — LIDOCAINE HYDROCHLORIDE AND EPINEPHRINE 15; 5 MG/ML; UG/ML
INJECTION, SOLUTION EPIDURAL
Status: COMPLETED | OUTPATIENT
Start: 2020-05-13 | End: 2020-05-13

## 2020-05-13 RX ORDER — METOCLOPRAMIDE HYDROCHLORIDE 5 MG/ML
10 INJECTION INTRAMUSCULAR; INTRAVENOUS ONCE
Status: DISCONTINUED | OUTPATIENT
Start: 2020-05-13 | End: 2020-05-14 | Stop reason: HOSPADM

## 2020-05-13 RX ORDER — DEXTROSE, SODIUM CHLORIDE, SODIUM LACTATE, POTASSIUM CHLORIDE, AND CALCIUM CHLORIDE 5; .6; .31; .03; .02 G/100ML; G/100ML; G/100ML; G/100ML; G/100ML
INJECTION, SOLUTION INTRAVENOUS CONTINUOUS
Status: DISCONTINUED | OUTPATIENT
Start: 2020-05-13 | End: 2020-05-15 | Stop reason: HOSPADM

## 2020-05-13 RX ORDER — IBUPROFEN 600 MG/1
600 TABLET ORAL EVERY 6 HOURS PRN
Status: DISCONTINUED | OUTPATIENT
Start: 2020-05-13 | End: 2020-05-15 | Stop reason: HOSPADM

## 2020-05-13 RX ORDER — SODIUM CHLORIDE, SODIUM GLUCONATE, SODIUM ACETATE, POTASSIUM CHLORIDE AND MAGNESIUM CHLORIDE 526; 502; 368; 37; 30 MG/100ML; MG/100ML; MG/100ML; MG/100ML; MG/100ML
1500 INJECTION, SOLUTION INTRAVENOUS ONCE
Status: COMPLETED | OUTPATIENT
Start: 2020-05-13 | End: 2020-05-13

## 2020-05-13 RX ORDER — ONDANSETRON 4 MG/1
4 TABLET, ORALLY DISINTEGRATING ORAL EVERY 6 HOURS PRN
Status: DISCONTINUED | OUTPATIENT
Start: 2020-05-13 | End: 2020-05-15 | Stop reason: HOSPADM

## 2020-05-13 RX ORDER — SODIUM CHLORIDE, SODIUM GLUCONATE, SODIUM ACETATE, POTASSIUM CHLORIDE AND MAGNESIUM CHLORIDE 526; 502; 368; 37; 30 MG/100ML; MG/100ML; MG/100ML; MG/100ML; MG/100ML
INJECTION, SOLUTION INTRAVENOUS
Status: COMPLETED
Start: 2020-05-13 | End: 2020-05-13

## 2020-05-13 RX ADMIN — OXYTOCIN 1 MILLI-UNITS/MIN: 10 INJECTION, SOLUTION INTRAMUSCULAR; INTRAVENOUS at 10:00

## 2020-05-13 RX ADMIN — SODIUM CHLORIDE, POTASSIUM CHLORIDE, SODIUM LACTATE AND CALCIUM CHLORIDE: 600; 310; 30; 20 INJECTION, SOLUTION INTRAVENOUS at 10:00

## 2020-05-13 RX ADMIN — LIDOCAINE HYDROCHLORIDE,EPINEPHRINE BITARTRATE 3 ML: 15; .005 INJECTION, SOLUTION EPIDURAL; INFILTRATION; INTRACAUDAL; PERINEURAL at 17:50

## 2020-05-13 RX ADMIN — ROPIVACAINE HYDROCHLORIDE 200 MG: 2 INJECTION, SOLUTION EPIDURAL; INFILTRATION at 17:56

## 2020-05-13 RX ADMIN — MISOPROSTOL 1000 MCG: 200 TABLET ORAL at 21:55

## 2020-05-13 RX ADMIN — SODIUM CHLORIDE, POTASSIUM CHLORIDE, SODIUM LACTATE AND CALCIUM CHLORIDE: 600; 310; 30; 20 INJECTION, SOLUTION INTRAVENOUS at 16:53

## 2020-05-13 RX ADMIN — Medication 2000 ML/HR: at 21:34

## 2020-05-13 SDOH — ECONOMIC STABILITY: FOOD INSECURITY: WITHIN THE PAST 12 MONTHS, YOU WORRIED THAT YOUR FOOD WOULD RUN OUT BEFORE YOU GOT MONEY TO BUY MORE.: NEVER TRUE

## 2020-05-13 SDOH — ECONOMIC STABILITY: FOOD INSECURITY: WITHIN THE PAST 12 MONTHS, THE FOOD YOU BOUGHT JUST DIDN'T LAST AND YOU DIDN'T HAVE MONEY TO GET MORE.: NEVER TRUE

## 2020-05-13 SDOH — ECONOMIC STABILITY: TRANSPORTATION INSECURITY
IN THE PAST 12 MONTHS, HAS LACK OF TRANSPORTATION KEPT YOU FROM MEETINGS, WORK, OR FROM GETTING THINGS NEEDED FOR DAILY LIVING?: NO

## 2020-05-13 SDOH — ECONOMIC STABILITY: TRANSPORTATION INSECURITY
IN THE PAST 12 MONTHS, HAS THE LACK OF TRANSPORTATION KEPT YOU FROM MEDICAL APPOINTMENTS OR FROM GETTING MEDICATIONS?: NO

## 2020-05-13 ASSESSMENT — COPD QUESTIONNAIRES
IN THE PAST 12 MONTHS DO YOU DO LESS THAN YOU USED TO BECAUSE OF YOUR BREATHING PROBLEMS: DISAGREE/UNSURE
HAVE YOU SMOKED AT LEAST 100 CIGARETTES IN YOUR ENTIRE LIFE: NO/DON'T KNOW
DO YOU EVER COUGH UP ANY MUCUS OR PHLEGM?: NO/ONLY WITH OCCASIONAL COLDS OR INFECTIONS
COPD SCREENING SCORE: 0
DURING THE PAST 4 WEEKS HOW MUCH DID YOU FEEL SHORT OF BREATH: NONE/LITTLE OF THE TIME

## 2020-05-13 ASSESSMENT — LIFESTYLE VARIABLES
CONSUMPTION TOTAL: INCOMPLETE
HAVE PEOPLE ANNOYED YOU BY CRITICIZING YOUR DRINKING: NO
TOTAL SCORE: 0
TOTAL SCORE: 0
EVER HAD A DRINK FIRST THING IN THE MORNING TO STEADY YOUR NERVES TO GET RID OF A HANGOVER: NO
TOTAL SCORE: 0
EVER_SMOKED: NEVER
HAVE YOU EVER FELT YOU SHOULD CUT DOWN ON YOUR DRINKING: NO
EVER FELT BAD OR GUILTY ABOUT YOUR DRINKING: NO
ALCOHOL_USE: NO

## 2020-05-13 ASSESSMENT — PATIENT HEALTH QUESTIONNAIRE - PHQ9
2. FEELING DOWN, DEPRESSED, IRRITABLE, OR HOPELESS: NOT AT ALL
1. LITTLE INTEREST OR PLEASURE IN DOING THINGS: NOT AT ALL
SUM OF ALL RESPONSES TO PHQ9 QUESTIONS 1 AND 2: 0

## 2020-05-13 NOTE — PROGRESS NOTES
0710 pt arrived for scheduled IOL for IUGR, pt taken to waiting room for covid screening, POC discussed with patient.   0810 test resulted negative, pt taken to S224, pt will phone  to attend her a this time.   0830 RN at bedside, EFM and TOCO applied, admission profile completed, IV started, labs drawn and sent, POC discussed.  0915 SVE 2/60/-2.  0920 Dr. Cleary phoned. Message left.  0925 Dr. Cleary updated, orders received. Will start low dose pitocin as clinically indicated.   1215 Dr. Cleary phoned RN, status update given, will continue with POC.  1259 Dr. Cleary at bedside, SVE 4/70/-2, AROM, clear fluid noted, IUPC placed. Pt tolerated well, POC discussed, all questions answered.  1653 RN at bedside, pt increasingly more uncomfortable, bolus for epidural initiated, anesthesia aware.  1715 Dr. Cleary phoned RN, status update given, will continue with POC.  1739 Dr Cruz at bedside to place epidural, time out completed, pt consented, all questions answered, pt verbalized understanding, consents signed.   1749 epidural placed, negative test dose appreciated, pt tolerated well.  1800 pt feeling pressure, SVE 7-8/100/-1.  1815 mix placed, pt tolerated well. Pt resting comfortable in bed, denies needs, encouraged to call RN for any  Needs, wants, desires or concerns.   1838 Rn phoned Dr. Cleary, to update on pt status/ SVE and FHR tracing.  message left, will await return phone call.  1844 Dr. Cleary updated on pt status, MD will be in call room as needed. Strip reviewed over phone.  1900 bedside report given to NOC RN, assumed care, POC discussed, all questions answered. VSS.

## 2020-05-13 NOTE — H&P
"L AND D ADMISSION H AND P (DICTATED)    ADMISSION DIAGNOSIS:    1.  IUP at 39w6d.  2.  IUGR.  3.  GBS negative.  4.  IOL.    Recent Labs     20  0855   WBC 10.2   RBC 4.43   HEMOGLOBIN 13.4   HEMATOCRIT 39.0   MCV 88.0   MCH 30.2   RDW 46.1   PLATELETCT 205   MPV 11.3   NEUTSPOLYS 75.00*   LYMPHOCYTES 16.40*   MONOCYTES 7.20   EOSINOPHILS 0.20   BASOPHILS 0.30     /72   Pulse 76   Temp 36.2 °C (97.2 °F) (Temporal)   Resp 18   Ht 1.549 m (5' 1\")   Wt 73.5 kg (162 lb)     A, A, and O x 3 NAD    SVE: 3-4/70%/-2/BBOW    AROM - clear fluid at 1259.    IUPC placed.    TOCO: every 3-5 minutes on 3 mu/min of IV pitocin.    EFW - 2900 grams    EFM: category I tracing.  Reactive and without decelerations.    A/P: 29 yo  at 39w6d admitted for IOL secondary to IUGR and grade III placenta.    1.  Continue IV pitocin.  2.  AROM clear fluid with IUPC placed.  3.  Anticipate .  4.  GBS negative - on no ABX.  "

## 2020-05-13 NOTE — CARE PLAN
Problem: Safety  Goal: Will remain free from injury  Outcome: PROGRESSING AS EXPECTED  Note: Pt placed on EFM and TOCO to monitor for fetal well being and uterine activity.       Problem: Knowledge Deficit  Goal: Knowledge of disease process/condition, treatment plan, diagnostic tests, and medications will improve  Outcome: PROGRESSING AS EXPECTED  Note: Pt education regarding POC provided, all questions answered, pt verbalized understanding.

## 2020-05-14 LAB
ERYTHROCYTE [DISTWIDTH] IN BLOOD BY AUTOMATED COUNT: 45.9 FL (ref 35.9–50)
HCT VFR BLD AUTO: 34.4 % (ref 37–47)
HGB BLD-MCNC: 11.8 G/DL (ref 12–16)
MCH RBC QN AUTO: 30.5 PG (ref 27–33)
MCHC RBC AUTO-ENTMCNC: 34.3 G/DL (ref 33.6–35)
MCV RBC AUTO: 88.9 FL (ref 81.4–97.8)
PATHOLOGY CONSULT NOTE: NORMAL
PLATELET # BLD AUTO: 163 K/UL (ref 164–446)
PMV BLD AUTO: 10.8 FL (ref 9–12.9)
RBC # BLD AUTO: 3.87 M/UL (ref 4.2–5.4)
WBC # BLD AUTO: 16.1 K/UL (ref 4.8–10.8)

## 2020-05-14 PROCEDURE — 700102 HCHG RX REV CODE 250 W/ 637 OVERRIDE(OP): Performed by: OBSTETRICS & GYNECOLOGY

## 2020-05-14 PROCEDURE — 303615 HCHG EPIDURAL/SPINAL ANESTHESIA FOR LABOR

## 2020-05-14 PROCEDURE — 770002 HCHG ROOM/CARE - OB PRIVATE (112)

## 2020-05-14 PROCEDURE — 85027 COMPLETE CBC AUTOMATED: CPT

## 2020-05-14 PROCEDURE — 36415 COLL VENOUS BLD VENIPUNCTURE: CPT

## 2020-05-14 PROCEDURE — A9270 NON-COVERED ITEM OR SERVICE: HCPCS | Performed by: OBSTETRICS & GYNECOLOGY

## 2020-05-14 RX ORDER — METHYLERGONOVINE MALEATE 0.2 MG/ML
0.2 INJECTION INTRAVENOUS
Status: DISCONTINUED | OUTPATIENT
Start: 2020-05-14 | End: 2020-05-15 | Stop reason: HOSPADM

## 2020-05-14 RX ORDER — MISOPROSTOL 200 UG/1
600 TABLET ORAL
Status: DISCONTINUED | OUTPATIENT
Start: 2020-05-14 | End: 2020-05-15 | Stop reason: HOSPADM

## 2020-05-14 RX ORDER — DOCUSATE SODIUM 100 MG/1
100 CAPSULE, LIQUID FILLED ORAL 2 TIMES DAILY PRN
Status: DISCONTINUED | OUTPATIENT
Start: 2020-05-14 | End: 2020-05-15 | Stop reason: HOSPADM

## 2020-05-14 RX ORDER — VITAMIN A ACETATE, BETA CAROTENE, ASCORBIC ACID, CHOLECALCIFEROL, .ALPHA.-TOCOPHEROL ACETATE, DL-, THIAMINE MONONITRATE, RIBOFLAVIN, NIACINAMIDE, PYRIDOXINE HYDROCHLORIDE, FOLIC ACID, CYANOCOBALAMIN, CALCIUM CARBONATE, FERROUS FUMARATE, ZINC OXIDE, CUPRIC OXIDE 3080; 12; 120; 400; 1; 1.84; 3; 20; 22; 920; 25; 200; 27; 10; 2 [IU]/1; UG/1; MG/1; [IU]/1; MG/1; MG/1; MG/1; MG/1; MG/1; [IU]/1; MG/1; MG/1; MG/1; MG/1; MG/1
1 TABLET, FILM COATED ORAL EVERY MORNING
Status: DISCONTINUED | OUTPATIENT
Start: 2020-05-14 | End: 2020-05-15 | Stop reason: HOSPADM

## 2020-05-14 RX ORDER — SODIUM CHLORIDE, SODIUM LACTATE, POTASSIUM CHLORIDE, CALCIUM CHLORIDE 600; 310; 30; 20 MG/100ML; MG/100ML; MG/100ML; MG/100ML
INJECTION, SOLUTION INTRAVENOUS PRN
Status: DISCONTINUED | OUTPATIENT
Start: 2020-05-14 | End: 2020-05-15 | Stop reason: HOSPADM

## 2020-05-14 RX ORDER — CARBOPROST TROMETHAMINE 250 UG/ML
250 INJECTION, SOLUTION INTRAMUSCULAR
Status: DISCONTINUED | OUTPATIENT
Start: 2020-05-14 | End: 2020-05-15 | Stop reason: HOSPADM

## 2020-05-14 RX ADMIN — VITAMIN A, VITAMIN C, VITAMIN D-3, VITAMIN E, VITAMIN B-1, VITAMIN B-2, NIACIN, VITAMIN B-6, CALCIUM, IRON, ZINC, COPPER 1 TABLET: 4000; 120; 400; 22; 1.84; 3; 20; 10; 1; 12; 200; 27; 25; 2 TABLET ORAL at 05:51

## 2020-05-14 ASSESSMENT — PAIN SCALES - GENERAL: PAIN_LEVEL: 0

## 2020-05-14 NOTE — CARE PLAN
Problem: Altered physiologic condition related to immediate post-delivery state and potential for bleeding/hemorrhage  Goal: Patient physiologically stable as evidenced by normal lochia, palpable uterine involution and vital signs within normal limits  5/14/2020 0116 by Kelsey C. Koyanagi, R.N.  Outcome: PROGRESSING AS EXPECTED  Note: VSS and within normal parameters. Fundus palpable and firm, lochia light rubra. Will continue to monitor.

## 2020-05-14 NOTE — L&D DELIVERY NOTE
LABOR AND DELIVERY    DELIVERY SUMMARY    STAGE I LABOR:    The patient was admitted as a 31 yo  at 39w6d based upon her sure LMP of 19 which is consistent with a 6w4d u/s performed on 19.  The patient was admitted for induction of labor secondary to IUGR and borderline oligohydramnios.  She was noted to be GBS negative.  At the time of admission, her cervix was 2/60%/-2 and she was started on IV pitocin per protocol.  She was underwent AROM clear fluid at 1259.  She received an epidural for labor analgesia.  She progressed to completely dilated at 1915.      STAGE II LABOR: 2 HOURS 18 MINUTES    I was present for a VAVD of a viable male infant at 2133.  The patient was consented for a VAVD as the fetal heart rate was noted to have late decelerations with maternal expulsive efforts.  The patient had just had her catheter removed.  The cervix was examined and she was 10/100%/+2/CRUZITO.  The Mityone Mushroom vacuum was applied to the median flexion point and five pulls with no pop offs were noted but there was not a good seal with this vacuum and a Mityone Pelletier vacuum was applied and after two pulls and no pop offs, the vertex of the infant  delivered without difficulty.      No nuchal cord was palpated and no shoulder dystocia was encountered.  The remainder of the infant delivered without difficulty.  The infant was placed on the mother's abdomen.  The infant's mouth and nose were bulb suctioned.  Delayed cord clamping for over 7 minutes was performed and then the cord was clamped and cut.      A segment of cord was clamped and cut for a cord gas and these results are as follows:    Results for JOSE ROBERTO TINOCO (MRN [id number]) as of 2020 22:15   Ref. Range 2020 21:45   Cord Bg Ph Unknown 7.18   Cord Bg Pco2 Latest Units: mmHg 50.4   Cord Bg Po2 Latest Units: mmHg 45.4   Cord Bg Hco3 Latest Units: mmol/L 18   Cord Bg Base Excess Latest Units: mmol/L -10   Cord Bg O2 Saturation Latest Units: %  76.9   CV Ph Unknown 7.16   CV Pco2 Latest Units: mmHg 43.7   CV Po2 Unknown 41.5   CV Hco3 Latest Units: mmol/L 15   CV Base Excess Latest Units: mmol/L -13   CV O2 Saturation Latest Units: % 69.3       The APGAR scores were 8 at one minute and 9 at five minutes.    The infant weight is pending.    STAGE III LABOR: 19 MINUTES    The placenta delivered intact with a 3-vessel cord at 2152.    The patient's perineum was examined and found to have sustained a 2nd degree perineal laceration.  This was repaired in the usual fashion with 3-0 vicryl.    Bimanual uterine massage and exploration was performed and small clots were removed from the lower uterine segment.    1000 mcg of cytotec per rectum x 1 after the above examination.      EBL - 400 cc

## 2020-05-14 NOTE — CARE PLAN
Problem: Alteration in comfort related to episiotomy, vaginal repair and/or after birth pains  Goal: Patient verbalizes acceptable pain level  Outcome: PROGRESSING AS EXPECTED  Note: Pain management discussed with pt. Will notify this RN if PRN pain medication is needed. Will continue to monitor.

## 2020-05-14 NOTE — PROGRESS NOTES
0700 Received bedside report from STEFANIE Rothman. Discussed plan of care. Patient will call for pain medication as needed. All needs met at this time.

## 2020-05-14 NOTE — PROGRESS NOTES
1855 Bedside report received from TONYA Berumen RN, POC discussed, assumed care    1915 SVE complete/+1, Dr Cleary updated    1927 Started pushing    2011 Stopped pushing, pt to labor down, Dr Cleary updated    2050 Repetitive decelerations noted, Dr Cleary aware of tracing, MD to come to bedside to assess    2052 Pt restarted pushing    2055 Dr Cleary at bedside    2105 MD requesting vacuum, charge RN notified    2113 Charge RN, delivery RN, RT, and NICU at bedside    2114 Vacuum first applied, see delivery record    2133 VAVD of a viable male, 8/9 APGARs    2152 Placenta delivered spontaneously intact    2300 Report given to SHELBY Garcia RN, POC discussed, assumed care

## 2020-05-14 NOTE — H&P
DATE OF ADMISSION:  2020    ADMISSION DIAGNOSES:   1.  Intrauterine pregnancy at 39+6 weeks' gestation.  2.  Intrauterine growth restriction with borderline oligohydramnios.  3.  Group B streptococcus negative.    HISTORY OF PRESENT ILLNESS:  The patient is a 30-year-old  1, para 0 at   39+6 weeks' gestation based upon her LMP of 2019, which is consistent   with a 6+4 week ultrasound performed on 2019.  The patient was admitted   to labor and delivery secondary to intrauterine growth restriction and   borderline oligohydramnios.    At the time of admission, her cervix was 2 cm, 60% effaced, -2 station.  The   patient reported positive fetal movement.  She denied vaginal bleeding,   leakage of fluid.    PRENATAL CARE:  With Dr. Lexie Cleary, first visit on 2019 at 6+4 weeks'   gestation, total visits greater than 10.  Total weight gain 40 pounds.  Third   trimester blood pressures 102-130/66-82.    OBSTETRIC ULTRASOUND:  1.  On 2019 at 6+4 weeks' gestation, JOSÉ MIGUEL 05/15/2020.  2.  10/24/2019 at 10+2 weeks' gestation, JOSÉ MIGUEL 2020.  3.  2020 at 20+2 weeks' gestation, JOSÉ MIGUEL 2020, male fetus, estimated   fetal weight 348 g, 12 ounces, 15.8th percentile, breech presentation,   posterior placenta, no previa.  4.  On 2020 at 25+5 weeks' gestation, JOSÉ MIGUEL 05/15/2020, estimated fetal   weight 2 pounds 0 ounces, 907 g, posterior placenta, no previa.  5.  Performed on 2020, estimated fetal weight is less than 15th   percentile, BOO 7.    OBSTETRICAL HISTORY:  Primigravida.    PAST SURGICAL HISTORY:  Melvin tooth extraction.    PAST MEDICAL HISTORY:  None.    SOCIAL HISTORY:  She is .    ALLERGIES:  No known drug allergies.    PRENATAL LABS:  GBS negative on 2020.  Hep C viral antibody negative,   hepatitis B surface antigen negative, HIV negative, RPR nonreactive.  Blood   type O positive, antibody screen negative, rubella immune, cystic fibrosis,   fragile X  and SMA negative.  SARS-CoV-2 by PCR is negative.  [facility]   prenatal screen negative for trisomies 13, 18 and 21 male fetus.  Her 1-hour   GCT was 136.    PHYSICAL EXAMINATION:  VITAL SIGNS:  On admission, blood pressure 130/72, pulse 76, temperature 97.2,   respiratory rate 18.  GENERAL:  Alert, awake and oriented x3, in no acute distress.  ABDOMEN:  Gravid, vertex by Leopold's, estimated fetal weight 2900 grams.  PELVIC:  Singac, she is eugene every 3-5 minutes.  External fetal   monitoring category 1 tracing, reactive without decels.    LABORATORY DATA:  On admission, white count 10.2, hemoglobin 13.4, hematocrit   39, platelets 205.    ASSESSMENT AND PLAN:  A 30-year-old  1, para 0 at 39+6 weeks' gestation   based upon her last menstrual period, which is consistent with a 6+4 week   ultrasound performed on 2019, who was admitted for induction of labor   secondary to intrauterine growth restriction and borderline oligohydramnios.    The patient is noted to be group B streptococcus negative.  She has a   favorable cervix and will be started on IV Pitocin per protocol.  We will   anticipate a normal spontaneous vaginal delivery.       ____________________________________     MD JOHN BAJWA / NTS    DD:  2020 22:26:17  DT:  2020 00:16:56    D#:  [id number]  Job#:  [id number]

## 2020-05-14 NOTE — LACTATION NOTE
Baby is now 15 hours old. Baby has not been able to nurse since delivery. Falls asleep at breast after few superficial suckles. See infant's chart for Assessment and LATCH score.    Mom's breasts wide spaced with everted nipples. Mom states increased breast growth and darkening areola during pregnancy. Easily hand expressed colostrum which drops have been given to Jason positive baby. Mom denies history of PCOS, hypothyroid, and breast surgery.    Discussed starting pumping for hormonal stimulation and parents in agreement. Kathy WATTS will set up pump.    Lactation will continue to follow. Parents strongly expressed desire to avoid formula.

## 2020-05-14 NOTE — ANESTHESIA PREPROCEDURE EVALUATION
Relevant Problems   No relevant active problems       Physical Exam    Airway   Mallampati: II  TM distance: >3 FB  Neck ROM: full       Cardiovascular - normal exam  Rhythm: regular  Rate: normal  (-) murmur     Dental - normal exam           Pulmonary - normal exam  Breath sounds clear to auscultation     Abdominal    Neurological - normal exam         Other findings: gravid            Anesthesia Plan    ASA 2       Plan - epidural   Neuraxial block will be labor analgesia              Pertinent diagnostic labs and testing reviewed    Informed Consent:    Anesthetic plan and risks discussed with patient.

## 2020-05-14 NOTE — ANESTHESIA QCDR
2019 [facility] Clinical Data Registry (for Quality Improvement)     Postoperative nausea/vomiting risk protocol (Adult = 18 yrs and Pediatric 3-17 yrs)- (430 and 463)  General inhalation anesthetic (NOT TIVA) with PONV risk factors: No  Provision of anti-emetic therapy with at least 2 different classes of agents: N/A  Patient DID NOT receive anti-emetic therapy and reason is documented in Medical Record: N/A    Multimodal Pain Management- (477)  Non-emergent surgery AND patient age >= 18:   Use of Multimodal Pain Management, two or more drugs and/or interventions, NOT including systemic opioids:   Exception: Documented allergy to multiple classes of analgesics:     Smoking Abstinence (404)  Patient is current smoker (cigarette, pipe, e-cig, marijuanna):   Elective Surgery:   Abstinence instructions provided prior to day of surgery:   Patient abstained from smoking on day of surgery:     Pre-Op Beta-Blocker in Isolated CABG (44)  Isolated CABG AND patient age >= 18:   Beta-blocker admin within 24 hours of surgical incision:   Exception:of medical reason(s) for not administering beta blocker within 24 hours prior to surgical incision (e.g., not  indicated,other medical reason):     PACU assessment of acute postoperative pain prior to Anesthesia Care End- Applies to Patients Age = 18- (ABG7)  Initial PACU pain score is which of the following: < 7/10  Patient unable to report pain score: N/A    Post-anesthetic transfer of care checklist/protocol to PACU/ICU- (426 and 427)  Upon conclusion of case, patient transferred to which of the following locations: PACU/Non-ICU  Use of transfer checklist/protocol: Yes  Exclusion: Service Performed in Patient Hospital Room (and thus did not require transfer): N/A  Unplanned admission to ICU related to anesthesia service up through end of PACU care- (MD51)  Unplanned admission to ICU (not initially anticipated at anesthesia start time): No

## 2020-05-14 NOTE — ANESTHESIA POSTPROCEDURE EVALUATION
Patient: Kenya Estrada    Procedure Summary     Date:  05/13/20 Room / Location:      Anesthesia Start:  1742 Anesthesia Stop:  2133    Procedure:  Labor Epidural Diagnosis:      Scheduled Providers:   Responsible Provider:  Georgi Cruz M.D.    Anesthesia Type:  epidural ASA Status:  2          Final Anesthesia Type: epidural  Last vitals  BP   Blood Pressure: 108/66    Temp   36.8 °C (98.2 °F)    Pulse   Pulse: 81   Resp   17    SpO2   94 %      Anesthesia Post Evaluation    Patient location during evaluation: floor  Patient participation: complete - patient participated  Level of consciousness: awake and alert  Pain score: 0    Airway patency: patent  Anesthetic complications: no  Cardiovascular status: hemodynamically stable  Respiratory status: acceptable  Hydration status: euvolemic    PONV: none    patient able to participate, but full recovery from regional anesthesia has not occurred and is not expected within the stipulated timeframe for the completion of the evaluation       Nurse Pain Score: 1 (NPRS)

## 2020-05-14 NOTE — ANESTHESIA PROCEDURE NOTES
Epidural Block    Date/Time: 5/13/2020 5:50 PM  Performed by: Georgi Cruz M.D.  Authorized by: Georgi Cruz M.D.     Patient Location:  OB  Start Time:  5/13/2020 5:50 PM  End Time:  5/13/2020 5:55 PM  Reason for Block: labor analgesia    patient identified, IV checked, site marked, risks and benefits discussed, surgical consent, monitors and equipment checked, pre-op evaluation and timeout performed    Patient Position:  Sitting  Prep: ChloraPrep, patient draped and sterile technique    Monitoring:  Blood pressure, continuous pulse oximetry and heart rate  Approach:  Midline  Location:  L3-L4  Injection Technique:  EULALIA saline  Skin infiltration:  Lidocaine  Strength:  1%  Dose:  3ml  Needle Type:  Tuohy  Needle Gauge:  17 G  Needle Length:  3.5 in  Loss of resistance::  5  Catheter Size:  19 G  Catheter at Skin Depth:  12  Test Dose Result:  Negative

## 2020-05-14 NOTE — PROGRESS NOTES
"POSTPARTUM DAY #1 S/P VAVD    S:  Doing well.  Working on breast feeding but baby is having a hard time latching.  Moderate lochia.  Ambulating, voiding spontaneously, and tolerating a regular diet.  She denies fevers/chills/night sweats.    O:  /63   Pulse 78   Temp 36.8 °C (98.3 °F) (Temporal)   Resp 17   Ht 1.549 m (5' 1\")   Wt 73.5 kg (162 lb)   SpO2 94%     A, A, and O x 3 NAD    FF u/1    No c/c/e    Recent Labs     05/13/20  0855 05/14/20  0254   WBC 10.2 16.1*   RBC 4.43 3.87*   HEMOGLOBIN 13.4 11.8*   HEMATOCRIT 39.0 34.4*   MCV 88.0 88.9   MCH 30.2 30.5   RDW 46.1 45.9   PLATELETCT 205 163*   MPV 11.3 10.8   NEUTSPOLYS 75.00*  --    LYMPHOCYTES 16.40*  --    MONOCYTES 7.20  --    EOSINOPHILS 0.20  --    BASOPHILS 0.30  --      A/P: PPD #1 s/p VAVD with repair of 2nd degree perineal laceration.    1.  Ambulate TID.  2.  Working on breast feeding.  3.  Continue cares.  4.  Anticipate D/C tomorrow morning.  "

## 2020-05-14 NOTE — PROGRESS NOTES
Patient transferred from labor and delivery via wheelchair with infant in arms and FOB accompanying with belongings. Two RN verification of infant and parent armbands. Report received from DOMINGO Hernandez RN. Patient oriented to unit, call light, emergency light, and infant security. Assessment completed, fundus firm and palpable at U, lochia light rubra. Patient has already voided and is doing so without problems. Pain management discussed with pt. Patient declines intervention for pain at this time. IV patent, no s/s of infiltration at insertion site. Patient encouraged to call with needs. Second bag of pit to be tubed to station 34 from L&D.

## 2020-05-14 NOTE — L&D DELIVERY NOTE
DATE OF SERVICE:  2020    Stage I labor:  The patient was admitted as a 30-year-old  1, para 0,   at 39+6 weeks' gestation based upon her sure LMP of 2019, which is   consistent with a 6+4 week ultrasound performed on 2019.  The patient   was admitted for induction of labor secondary to IUGR with borderline   oligohydramnios and a grade III placenta.  The patient was noted to be GBS   negative.  At the time of admission, her cervix was 2 cm, 60% effaced, -2   station.  The patient was eugene spontaneously.  The patient was started   on IV Pitocin per protocol for labor augmentation.  The patient underwent   artificial rupture of membranes of clear fluid at 1259 and an IUPC was placed   at that time.  The patient received an epidural for labor analgesia.  The   patient progressed to completely dilated at 1915.    Stage II labor:  2 hours 18 minutes.    I was present for a vacuum-assisted vaginal delivery of a viable male infant   at 2133 on 2020.  The patient was consented for a vacuum-assisted   vaginal delivery, as the fetal heart rate was noted to have repetitive late   decelerations with maternal expulsive efforts.  The patient had just had her   Berrios catheter removed.  Her cervix was examined and she was 10 cm dilated,   100% effaced, +2 station in the CRUZITO position.  The MityOne mushroom vacuum was   applied to the median flexion point and approximately 5 pulls with 5   contractions and no pop-offs were noted, but there was found to be not a good   seal with this vacuum and a MityOne bell vacuum was applied.  After 2 pulls   and no pop-offs, the vertex of the infant delivered without difficulty.  The   patient was in Joe for maternal expulsive efforts.  There was no nuchal   cord palpated.  No shoulder dystocia was encountered.  The remainder of the   infant delivered without difficulty.  The infant was placed on the mother's   abdomen.  The infant's mouth and nose were  bulb suctioned.  Delayed cord   clamping for over 7 minutes was performed and then the cord was clamped and   cut.    A segment of cord was clamped and cut for cord gas and these results are as   follows:  Arterial pH 7.18, base excess -10, venous pH 7.16, base excess -13.    The Apgar scores were 8 at one minute and 9 at five minutes.    The infant weight is pending.    The patient received IV Pitocin after delivery of the infant.    Stage III labor:  19 minutes.  The placenta delivered intact with 3-vessel   cord at 2152.    The patient's perineum was examined and she was found to have sustained a   second-degree perineal laceration.  This laceration was repaired in the usual   fashion with 3-0 Vicryl in a multiple layer closure.    Bimanual uterine massage and exploration was performed and small clots removed   from the lower uterine segment.    Cytotec 1000 mcg per rectum x1 was inserted after the above examination.    ESTIMATED BLOOD LOSS:  400 mL.    NICU and respiratory therapy were present for delivery and the placenta will   be sent to pathology.       ____________________________________     MEHRDAD SHORT MD    HTA / NTS    DD:  2020 22:19:49  DT:  2020 05:51:26    D#:  [id number]  Job#:  [id number]

## 2020-05-14 NOTE — ANESTHESIA TIME REPORT
Anesthesia Start and Stop Event Times     Date Time Event    5/13/2020 1741 Ready for Procedure     1742 Anesthesia Start     2133 Anesthesia Stop        Responsible Staff  05/13/20    Name Role Begin End    Georgi Cruz M.D. Anesth 1742 2133        Preop Diagnosis (Free Text):  Pre-op Diagnosis             Preop Diagnosis (Codes):    Post op Diagnosis  Pregnant      Premium Reason  A. 3PM - 7AM    Comments:

## 2020-05-15 VITALS
HEART RATE: 69 BPM | RESPIRATION RATE: 18 BRPM | HEIGHT: 61 IN | OXYGEN SATURATION: 95 % | BODY MASS INDEX: 30.58 KG/M2 | TEMPERATURE: 97.4 F | WEIGHT: 162 LBS | DIASTOLIC BLOOD PRESSURE: 65 MMHG | SYSTOLIC BLOOD PRESSURE: 102 MMHG

## 2020-05-15 PROCEDURE — 3E0234Z INTRODUCTION OF SERUM, TOXOID AND VACCINE INTO MUSCLE, PERCUTANEOUS APPROACH: ICD-10-PCS | Performed by: OBSTETRICS & GYNECOLOGY

## 2020-05-15 PROCEDURE — 700111 HCHG RX REV CODE 636 W/ 250 OVERRIDE (IP): Performed by: OBSTETRICS & GYNECOLOGY

## 2020-05-15 PROCEDURE — 90471 IMMUNIZATION ADMIN: CPT

## 2020-05-15 PROCEDURE — 90707 MMR VACCINE SC: CPT | Performed by: OBSTETRICS & GYNECOLOGY

## 2020-05-15 RX ORDER — IBUPROFEN 600 MG/1
600 TABLET ORAL EVERY 6 HOURS PRN
Qty: 30 TAB | Refills: 3 | Status: SHIPPED | OUTPATIENT
Start: 2020-05-15 | End: 2022-06-01

## 2020-05-15 RX ORDER — PSEUDOEPHEDRINE HCL 30 MG
100 TABLET ORAL 2 TIMES DAILY PRN
Qty: 60 CAP | Refills: 1 | Status: SHIPPED | OUTPATIENT
Start: 2020-05-15 | End: 2022-06-01

## 2020-05-15 RX ADMIN — MEASLES, MUMPS, AND RUBELLA VIRUS VACCINE LIVE 0.5 ML: 1000; 12500; 1000 INJECTION, POWDER, LYOPHILIZED, FOR SUSPENSION SUBCUTANEOUS at 12:06

## 2020-05-15 ASSESSMENT — EDINBURGH POSTNATAL DEPRESSION SCALE (EPDS)
THE THOUGHT OF HARMING MYSELF HAS OCCURRED TO ME: NEVER
THINGS HAVE BEEN GETTING ON TOP OF ME: NO, I HAVE BEEN COPING AS WELL AS EVER
I HAVE BEEN ABLE TO LAUGH AND SEE THE FUNNY SIDE OF THINGS: AS MUCH AS I ALWAYS COULD
I HAVE FELT SCARED OR PANICKY FOR NO GOOD REASON: NO, NOT AT ALL
I HAVE BEEN SO UNHAPPY THAT I HAVE HAD DIFFICULTY SLEEPING: NOT AT ALL
I HAVE BEEN ANXIOUS OR WORRIED FOR NO GOOD REASON: NO, NOT AT ALL
I HAVE LOOKED FORWARD WITH ENJOYMENT TO THINGS: AS MUCH AS I EVER DID
I HAVE BEEN SO UNHAPPY THAT I HAVE BEEN CRYING: NO, NEVER
I HAVE BLAMED MYSELF UNNECESSARILY WHEN THINGS WENT WRONG: NO, NEVER
I HAVE FELT SAD OR MISERABLE: NO, NOT AT ALL

## 2020-05-15 NOTE — DISCHARGE SUMMARY
"DISCHARGE SUMMARY    DATE OF ADMISSION: 5/13/2020.    DATE OF DISCHARGE: 5/15/2020.    ADMISSION DIAGNOSIS:    1.  Intrauterine pregnancy at 39+6 weeks' gestation.  2.  Intrauterine growth restriction with borderline oligohydramnios.  3.  Group B streptococcus negative.    DISCHARGE DIAGNOSIS:    1.  As above.  2.  S/P VAVD with 2nd degree perineal laceration.    PROCEDURES: VAVD and repair of 2nd degree perineal laceration.    PPD #2     S:  Doing well.  Pain controlled.  Working on breast feeding as well as pumping and hand expression.  Lactation is seeing this patient.  She reports moderate lochia and pain is well controlled with ibuprofen.      O:  /65   Pulse 69   Temp 36.3 °C (97.4 °F) (Temporal)   Resp 18   Ht 1.549 m (5' 1\")   Wt 73.5 kg (162 lb)   SpO2 95%     A, A, and O x 3 NAD    FF u/1    No c/c/e    Recent Labs     05/13/20  0855 05/14/20  0254   WBC 10.2 16.1*   RBC 4.43 3.87*   HEMOGLOBIN 13.4 11.8*   HEMATOCRIT 39.0 34.4*   MCV 88.0 88.9   MCH 30.2 30.5   RDW 46.1 45.9   PLATELETCT 205 163*   MPV 11.3 10.8   NEUTSPOLYS 75.00*  --    LYMPHOCYTES 16.40*  --    MONOCYTES 7.20  --    EOSINOPHILS 0.20  --    BASOPHILS 0.30  --      A/P: PPD #2 s/p VAVD and repair of 2nd degree perineal laceration.    1.  Continue cares.  2.  Ambulate TID.  3. Continue to work on breast feeding, pumping, and hand expression after every pumping session.  Lactation to work with patient.  Continue prenatal vitamins while breast feeding.  4.  Discharge to home today right before 2130.  Follow up with Dr. Cleary in 6 weeks for a postpartum examination.  Discharge medications include: ibuprofen and colace.  5.  Call for signs/symptoms of DVT/PE, postpartum depression, and mastitis.  "

## 2020-05-15 NOTE — LACTATION NOTE
This note was copied from a baby's chart.  CORRINA is a primip whose infant has not latched and is now 36 hours old. She is pumping with result of 1-2ml which is feed back to infant via FF and he is also receiving 20 ml formula via bottle. He was circumcised yesterday afternoon and is Jason + with a bili now @10.5 zap; lab draw pending. Attempted latch with infant being very sleepy. Placed skin to skin and continues to not open wide enough to achieve latch. Shield used with successful latch and a couple of suckles with stimulation. Encouraged skin to skin often and to call for assistance with latch. See assessment for lactation education.

## 2020-05-15 NOTE — PROGRESS NOTES
1400 Set pt up on a breast pump due to infant not latching. Settings 80 cpm for initial 2 minutes, then to 60 cpm for the remainder. Suction comfortable at 30%. Educated on use, frequency and cleaning pump parts in between use.

## 2020-05-15 NOTE — PROGRESS NOTES
0700 Received bedside report from STEFANIE Morse. Discussed plan of care. Patient will call for pain medication as needed. All needs met at this time.

## 2020-05-15 NOTE — DISCHARGE INSTRUCTIONS
POSTPARTUM DISCHARGE INSTRUCTIONS FOR MOM    YOB: 1990   Age: 30 y.o.               Admit Date: 5/13/2020     Discharge Date: 5/15/2020  Attending Doctor:  Lexie Loredo, *                  Allergies:  Patient has no known allergies.    Discharged to home by car. Discharged via wheelchair, hospital escort: Yes.  Special equipment needed: Not Applicable  Belongings with: Personal  Be sure to schedule a follow-up appointment with your primary care doctor or any specialists as instructed.     Discharge Plan:   Diet Plan: Discussed  Activity Level: Discussed  Confirmed Follow up Appointment: Patient to Call and Schedule Appointment  Confirmed Symptoms Management: Discussed  Medication Reconciliation Updated: Yes    REASONS TO CALL YOUR OBSTETRICIAN:  1.   Persistent fever or shaking chills (Temperature higher than 100.4)  2.   Heavy bleeding (soaking more than 1 pad per hour); Passing clots  3.   Foul odor from vagina  4.   Mastitis (Breast infection; breast pain, chills, fever, redness)  5.   Urinary pain, burning or frequency  6.   Episiotomy infection  7.   Abdominal incision infection  8.   Severe depression longer than 24 hours    HAND WASHING  · Prior to handling the baby.  · Before breastfeeding or bottle feeding baby.  · After using the bathroom or changing the baby's diaper.    WOUND CARE  Ask your physician for additional care instructions.  In general:      VAGINAL CARE  · Nothing inside vagina for 6 weeks: no sexual intercourse, tampons or douching.  · Bleeding may continue for 2-4 weeks.  Amount may vary.    · Call your physician for heavy bleeding which means soaking more than 1 pad per hour    BIRTH CONTROL  · It is possible to become pregnant at any time after delivery and while breastfeeding.  · Plan to discuss a method of birth control with your physician at your follow up visit. visit.    DIET AND ELIMINATION  · Eating more fiber (bran cereal, fruits, and vegetables) and drinking  "plenty of fluids will help to avoid constipation.  · Urinary frequency after childbirth is normal.    POSTPARTUM BLUES  During the first few days after birth, you may experience a sense of the \"blues\" which may include impatience, irritability or even crying.  These feeling come and go quickly.  However, as many as 1 in 10 women experience emotional symptoms known as postpartum depression.    Postpartum depression:  May start as early as the second or third day after delivery or take several weeks or months to develop.  Symptoms of \"blues\" are present, but are more intense:  Crying spells; loss of appetite; feelings of hopelessness or loss of control; fear of touching the baby; over concern or no concern at all about the baby; little or no concern about your own appearance/caring for yourself; and/or inability to sleep or excessive sleeping.  Contact your physician if you are experiencing any of these symptoms.    Crisis Hotline:  · [place] Crisis Hotline:  5-956-KZOYLQH  Or 1-414.247.4581  · [place] Crisis Hotline:  1-514.169.4501  Or 060-359-3832    PREVENTING SHAKEN BABY:  If you are angry or stressed, PUT THE BABY IN THE CRIB, step away, take some deep breaths, and wait until you are calm to care for the baby.  DO NOT SHAKE THE BABY.  You are not alone, call a supporter for help.    · Crisis Call Center 24/7 crisis line 963-491-0005 or 1-928.600.5031  · You can also text them, text \"ANSWER\" to 433936    QUIT SMOKING/TOBACCO USE:  I understand the use of any tobacco products increases my chance of suffering from future heart disease and could cause other illnesses which may shorten my life. Quitting the use of tobacco products is the single most important thing I can do to improve my health. For further information on smoking / tobacco cessation call a Toll Free Quit Line at 1-340.943.7798 (*[employer] [place]) or 1-293.387.5525 (American Lung Association) or you can access the web based program at " www.lungusa.org.    · [place] Tobacco Users Help Line:  (662) 436-3554       Toll Free: 1-889.311.5247  · Quit Tobacco Program [facility] (113)028-4799    DEPRESSION / SUICIDE RISK:  As you are discharged from this [facility], it is important to learn how to keep safe from harming yourself.    Recognize the warning signs:  · Abrupt changes in personality, positive or negative- including increase in energy   · Giving away possessions  · Change in eating patterns- significant weight changes-  positive or negative  · Change in sleeping patterns- unable to sleep or sleeping all the time   · Unwillingness or inability to communicate  · Depression  · Unusual sadness, discouragement and loneliness  · Talk of wanting to die  · Neglect of personal appearance   · Rebelliousness- reckless behavior  · Withdrawal from people/activities they love  · Confusion- inability to concentrate     If you or a loved one observes any of these behaviors or has concerns about self-harm, here's what you can do:  · Talk about it- your feelings and reasons for harming yourself  · Remove any means that you might use to hurt yourself (examples: pills, rope, extension cords, firearm)  · Get professional help from the community (Mental Health, Substance Abuse, psychological counseling)  · Do not be alone:Call your Safe Contact- someone whom you trust who will be there for you.  · Call your local CRISIS HOTLINE 728-1638 or 016-411-8845  · Call your local Children's Mobile Crisis Response Team [place] (916) 478-8574 or www.[employer]  · Call the toll free National Suicide Prevention Hotlines   · National Suicide Prevention Lifeline 585-343-UESP (4031)  · National Hope Line Network 800-SUICIDE (576-6092)    DISCHARGE SURVEY:  Thank you for choosing [facility].  We hope we provided you with very good care.  You may be receiving a survey in the mail.  Please fill it out.  Your opinion is valuable to  us.    ADDITIONAL EDUCATIONAL MATERIALS GIVEN TO PATIENT:        My signature on this form indicates that:  1.  I have reviewed and understand the above information  2.  My questions regarding this information have been answered to my satisfaction.  3.  I have formulated a plan with my discharge nurse to obtain my prescribed medication for home.

## 2020-05-15 NOTE — CARE PLAN
Problem: Altered physiologic condition related to immediate post-delivery state and potential for bleeding/hemorrhage  Goal: Patient physiologically stable as evidenced by normal lochia, palpable uterine involution and vital signs within normal limits  Outcome: PROGRESSING AS EXPECTED  Note: Fundus firm, lochia light. Vitals WDL.      Problem: Potential for postpartum infection related to presence of episiotomy/vaginal tear and/or uterine contamination  Goal: Patient will be absent from signs and symptoms of infection  Outcome: PROGRESSING AS EXPECTED  Note: No S/S of infection. Vital signs WDL. Pt. Not in distress at this time. Will continue to monitor.

## 2020-05-16 NOTE — LACTATION NOTE
This note was copied from a baby's chart.  MOB was able to latch infant with minimal assist. She is feeling greater confidence in feeding her infant. Review plan for discharge to home.     Breastfeeding Plan:    Offer breast when infant showing cues.     Pump/hand express 8x/24 hours if latch and feeding is suboptimal and feed back EBM and then formula using supplemental guidelines.     IF latches with difficulty and vigorous feedings a minimum of 8x/24 hours, pump/hand express and feed back EBM.     Teach signs that infant is getting enough as 4-6 wet diapers by day four when the milk comes in, @ 1 week 6-8 voids there after, and increasing number of stools each day transitioning to bright yellow seedy loose stools.     Follow up with PEDS PCP as scheduled and call for a 1:1 lactation consultation if any problems develop.

## 2020-05-18 ENCOUNTER — OFFICE VISIT (OUTPATIENT)
Dept: OBGYN | Facility: CLINIC | Age: 30
End: 2020-05-18
Payer: COMMERCIAL

## 2020-05-18 DIAGNOSIS — O92.79 LACTATION DISORDER, POSTPARTUM CONDITION: ICD-10-CM

## 2020-05-18 DIAGNOSIS — O92.29 SORE NIPPLES DUE TO LACTATION: ICD-10-CM

## 2020-05-18 PROCEDURE — 99205 OFFICE O/P NEW HI 60 MIN: CPT | Performed by: NURSE PRACTITIONER

## 2020-05-18 NOTE — PROGRESS NOTES
Subjective:     Kenyacely Estrada is a 30 y.o. female here to establish lactation care. She is here today with baby, Kurt and , Jaylen.    Concerns:   Latch on difficulties  and nipple pain      HPI:   Pertinent  history:  with vacuum suction  Mother does not have a history of advanced maternal age, GDM, hypertension prior to pregnancy, insulin resistance, multiple gestation, PCOS and thyroid disease. These are common conditions which may interfere in establishing milk supply.  Other risk factors: Baby bae positive     Breast changes in pregnancy: Yes  History of breast surgeries: No      FEEDING HISTORY:    Past breastfeeding history: First baby   Hospital course: Baby did not latch in the first 36 hours, pumping initiated. Latched with nipple shield. Attempted latching before discharge, successful without the nipple shield. Discharge plan was to breastfeed when baby shows cues, pump or hand express 8x/24 hours, feeding back all expressed milk.    Currently 2020: Offering the breast then giving pumped milk via syringe, 2mls increments. Pumping after feeding yielding up to 1oz between both breast.  Both breasts: Yes  Bottle feeds: 0/24h, using syringe    Supplement: Supplementation initiated inpatient, Expressed breast milk and Formula (Used formula 2x in the hospital, has not used since coming home)  Quantity: 6-8mls  How given/devices: Syringe    Nipple Shield Use: 24 mm  Recommended by: [facility]  When started: In the hospital then stopped using it    Breast Pumping:   Frequency: Every 2-3 hours (7-8x/24 hours)  Type of pump: HGP  Flange size/type: 25mm  NO pain with pumping  Yielding approximately 1oz between both breast, parents report varying amounts throughout the day    ROS:  Constitutional: no fever, chills. Feeling well  Extremities Swelling: No extremity swelling  Gastrointestinal: Negative for nausea, vomiting, constipation  Breasts: No soreness of  breasts and Soreness of nipples  Psychiatric: No mood changes and Managing ok  Mental Health: No mention of feeling irritable, agitated, angry, overwhelmed, apathy, exhaustion nor having sleep changes outside infant feeds/demands or appetite changes       Objective:     General: no acute distress  Neurological:  Alert and oriented x3  Breasts: Symetrical, possible wide spacing, dense, Plugged Duct - no evidence and Mastitis - no S/S  Nipples: abraded and erythematous right breast, crack on the top of the nipple, not the face. The left breast is erythematous but no openings. Tender  Psychiatric: Normal mood and affect. Her behavior is normal. Judgment and thought content normal   Mental Health: Did NOT exhibit sadness, crying, feeling overwhelmed, agitation or hypervigilance.     Assessment/Plan & Lactation Counseling:     Infant Weight History:   05/13/2020: 6# 14.6oz  05/18/2020: 6# 5oz (Dr. Dorado)  05/18/2020: 6# 4.7oz   Infant intake at Breast:: L 6mls w/o NS + 4mls w/ nipple shield R 10mls w/ NS    Total: 20mls  Milk Transfer at this feeding:   Effective breastfeeding for the amount of milk available  Pumped: Type of Pump: Hospital grade  (Suction at 35%)  Quantity Pumped:  Total 3mls  Initiation of Feeding: Infant initiates  Position of Feeding:    Right: cross cradle  Left: football  Attachment Achieved: rapidly, w/o nipple shield mother reports pain. Latch achieved rapidly with nipple shield.   Nipple shield:  Size: 24mm       Introduced IP  Latch achieved, Yes  Suck Pattern at the breast: Chewing but when removing milk has a better sucking pattern with his cheeks.  Behavior Following Observed Feeding: content and sleeping  Nipple Pain from: High vacuum causing nipple strain resulting in damage along with creasing of the nipple     Latch: Assisted latch, pain when latching without nipple shield and Shallow latch  Suckling/Feeding: attaches, baby fed effectively, baby roots, elicits RAINA and  rhythmic  Milk Supply Available: Delayed onset and building  Low milk supply:   Likely due to: delay in lactogenesis II, ineffective or infrequent breast stimulation or milk removal and possible breast hypoplasia    Diagnosis/Problem  Patient disorder, postpartum condition delayed onset  Sore nipples due to lactation    Discussed concerns and symptoms as listed above in assessment and guidance summarized below.  Topics reviewed included:  • Herbs and medications for increasing supply and their potential side effects and efficacy. No evidence base exists to support their use  • The nature of infants oral structure and function and its impact on latch and transfer of milk.  Infant has a neck preference to his left, had a vacuum delivery.  On digital gloved exam high compression tongue does cup and extend over the gumline but when lip is pulled down cannot maintain tongue contact on the finger.  Nipple shield will address this effectively.  • Triple feeding and its sustainability and its impact on the mother baby relationship  •  Maternal Mental Health: Mom in daily conversations with friend in [place] who is supporting her with different strategies.  Parents have been using syringe feeding since discharge, discussed between that and triple feeding how overwhelming that can be.  It is appropriate for an infant to have a bottle a day 5 of life.  • Sleep or lack of and strategies to manage restorative sleep, although short amounts, significant to the mental health of the mother.   • Self Care. Support, rest, getting out of the house for fresh air.  • Milk supply is dependent on how many times the baby removes milk and how well the breasts are emptied in a 24 hour period. This is a biological reality that we can't work around. If, for any reason, your baby is not latching, or you are not able to nurse, then it is important for you to remove the milk instead by pumping or hand expression.  There's no magic trick, tea,  cookie or supplement that will maintain your milk supply. One  must  effectively remove milk to continue to make and maximize milk. In the early days and weeks that can be 8+ times in 24 hours. For older babies, on average 6-7 + times in 24 hours.    •  Low Milk Supply: Causes  o Wide spacing,  o Lack of nipple/breast stimulation in the first 36 hours (Baby at the breast but not suckling, mostly non nutritive sucking)  o Lack of breast emptying (Baby at the breast but no removing much milk)  • Feeding: Feed your baby every 1.5-3 hours, more often if baby acts hungry. Awaken baby for feeding if going over 3 hours in the day. ONE four hour at night is acceptable if has had 8 prior feedings in 24 hours.  Need to get in 8-12 feedings per 24 hours.   o  Supplement: Continue with expressed breastmilk but if infant is frustrated fussy, then formula will need to be provided   o give Expressed Breast Milk first before using formula with paced bottle feeding  ? Paced Bottle Feeding Video: https://www.[employer].com/watch?v=ZgVAJ8fMO8M  • Positioning Techniques for bare breast  o Suggested positions Cross cradle right, Football left  o Fine tune position by making sure your fingers beneath the breast as well as your bra, are out of the way of your baby's chin.  o Positioning:  Many positions shown, great sidelying at 7 minutes.   o See http://globalhealthmedia.org/portfolio-items/positions-for-breastfeeding/?psxqspyciNL=04379  • Latch on Techniques for bare breast, modify for nipple shield use  o Fine tune latch:  - By holding your baby more securely at the breast, assisting your baby to stay attached by:  - Bringing your baby to your breast, not breast to the baby  - Your baby's cheek to touch breast securely, nose tipped back  - Hold your baby firmly in place so when your baby forgets to suck and picks it back up again your baby is in the correct spot. You will be extinguishing behavior and replacing it with a deeper latch to  "stimulate suck and provide satisfaction at the breast  - Your baby needs as much breast as deep in the mouth as possible to allow your nipples to heal and for you more importantly to maximize efficiency at the breast  - Latch is asymmetrical, leading with the chin, getting more underneath.  • Nipple shield: 24mm Medela, before applying, roll shield in on itself and allow breast to be pulled  in to the tip.   •  Triple feeding: A short term solution: Breast/bottle/pump:  o FIRST decide what you can do at that feeding and you may decide to double feed -pump and bottle, if you are going to offer the breast at that feeding:  - nurse first and limit time on the breasts to 20+/- min total  - finish with bottle  - pump afterwards to finish emptying your breasts which will help increase milk supply  - use your own pumped milk, formula or donor human milk  - 30gm or ml = 1oz  •  Pumping guidelines:  o Both breasts using \"Hands-on Pumping\" for 10-15 minutes to stimulate milk production. See video at : [email or web address]/Breastfeeding/MaxProduction.html.  o Pumping is effective but can quickly exhaust and overwhelm a new mother  o Pump 8 times in 24 hours after a feeding  o Type of pump:  - Hospital grade  - http://www.[employer].com/healthcare-professionals/videos/ameda-platinum-with-hygienikit-video  - Always double pump  - Has Medela at home  o How long will vary woman to woman, typically 8-15 minutes, or 1-2 minutes after flow slows  o Flange Fit: Freely moving nipple in the tunnel with some movement of the areola.  - Today's evaluation indicates appropriate flange  • Increasing supply besides Galactogogues and Pumping: Warmth, Relaxation , Physical, auditory narratives, Childbirth relaxation Techniques, Acupuncture and acupressure, Shoulder Massages and Take a nap  • Connect with other mothers:  o Facebook:   - [place] Breast[facility]: [email or web address].[email or web address]  - Well-Nourished Babies (Private group " for questions and support): [email or web address]/[email or web address]  o Breastfeeding [place] for support not assessment: Tuesday by Zoom, email invitation will be sent.  - SVETLANA Garcia, IBCLC  and Paula Tucker, IBCLC generally facilitate Tuesday Breastfeeding [place]    • Nipple care:  o May apply breastmilk  o Moist-oily ointment after feeding/pumping, ie Lanolin nipple butter, coconut or olive oil, if desired/needed 2-3 times/day until nipples are healed  o You do not need to wash this off before pumping or feeding the baby  o Soft shells recommended       Mom expressed understanding, and asked appropriate questions.    Summary: Mom and dad have diligently worked with breast-feeding this baby, at the breast each feeding despite pain and syringe feeding 2-5 mils of pumped milk into milk increments after each feeding.  Adjusted latch for depth but infant still creased nipple so applied a 24 mm nipple shield and baby remove milk effectively and did not hurt the mom.  We will continue with pumping following each feeding for the next 4 days to increase milk supply and have weight check on Friday and reevaluate the plan.    Follow-up for infant weight check and dyad breastfeeding evaluation in 4 day(s)  Please call [phone] if you have not scheduled your next appointment    A total of 75 minutes, this does not include infant assessment time, were spent face to face with more than 50% spent counseling and coordinating care as detailed in the above note.     PLEASE NOTE: Some of this note was created using voice recognition software. I have made every reasonable attempt to correct obvious errors, but I expect that there may be errors of grammar and possibly content that I did not discover prior finalizing this note.  RYAN Lopez.

## 2020-05-22 ENCOUNTER — GYNECOLOGY VISIT (OUTPATIENT)
Dept: OBGYN | Facility: CLINIC | Age: 30
End: 2020-05-22
Payer: COMMERCIAL

## 2020-05-22 DIAGNOSIS — O92.79 LACTATION DISORDER, POSTPARTUM CONDITION: ICD-10-CM

## 2020-05-22 PROCEDURE — 99215 OFFICE O/P EST HI 40 MIN: CPT | Performed by: NURSE PRACTITIONER

## 2020-05-22 NOTE — PROGRESS NOTES
Subjective:     Kenyacely Estrada is a 30 y.o. female here to establish lactation care with me. She is here today with baby, Kurt and , Jaylen.    Concerns: Latch on difficulties, nipple pain, baby always seems hungry, feeding evaluation and weight check        HPI:   Pertinent  history:  with vacuum suction  Mother does not have a history of advanced maternal age, GDM, hypertension prior to pregnancy, insulin resistance, multiple gestation, PCOS and thyroid disease. These are common conditions which may interfere in establishing milk supply.  Other risk factors: Baby bae positive     Breast changes in pregnancy: Yes  History of breast surgeries: No      FEEDING HISTORY:    Past breastfeeding history: First baby   Hospital course: Baby did not latch in the first 36 hours, pumping initiated. Latched with nipple shield. Attempted latching before discharge, successful without the nipple shield. Discharge plan was to breastfeed when baby shows cues, pump or hand express 8x/24 hours, feeding back all expressed milk.    Prior to appointment on 2020: Offering the breast then giving pumped milk via syringe, 2mls increments. Pumping after feeding yielding up to 1oz between both breast.  Both breasts: Yes   Currently 2020: Feeding upwards of 10x every 24 hours, offering the breast for 15-30 minutes. If Kurt still seems hungry mom offers the breast again. Pumping after most feedings yielding 5-8mls total. If pumping in place of feeding, yielding 12mls total. Offered 1oz of formula last night because he would not calm down. Slept for 2 hours, usually only sleeps for 15-20 minutes.    Bottle feeds: 1/24h    Supplement: Supplementation initiated inpatient, Expressed breast milk and Formula   Quantity: 6-8mls typically, offered 30mls last night for the first time  How given/devices: Syringe and bottle    Nipple Shield Use: 24 mm  Recommended by: [facility]  When started:  In the hospital     Breast Pumping:   Frequency: Every 2-3 hours, after most feedings (7-8x/24 hours)  Type of pump: HGP  Flange size/type: 25mm  NO pain with pumping  Yielding approximately 5-8mls between both breast, parents report varying amounts throughout the day    ROS:  Constitutional: no fever, chills. Feeling well  Extremities Swelling: No extremity swelling  Gastrointestinal: Negative for nausea, vomiting, constipation  Breasts: No soreness of breasts and Soreness of nipples  Psychiatric: No mood changes and Managing ok  Mental Health: No mention of feeling irritable, agitated, angry, overwhelmed, apathy, exhaustion nor having sleep changes outside infant feeds/demands or appetite changes       Objective:     General: no acute distress  Neurological:  Alert and oriented x3  Breasts: Symetrical, possible wide spacing, dense, Plugged Duct - no evidence and Mastitis - no S/S, mother reports lumps in both armpits, started during pregnancy. No worse or better since delivery. Suggest she call OB to discuss.   Nipples: Intact, tender   Psychiatric: Normal mood and affect. Her behavior is normal. Judgment and thought content normal   Mental Health: Did NOT exhibit sadness, crying, feeling overwhelmed, agitation or hypervigilance.     Assessment/Plan & Lactation Counseling:     Infant Weight History:   05/13/2020: 6# 14.6oz  05/18/2020: 6# 5oz (Dr. Dorado)  05/18/2020: 6# 4.7oz (10mls L / 10mls R / 3mls P)  05/20/2020: 6# 8oz (Dr. Dorado)  05/22/2020: 6# 5.9oz  Infant intake at Breast:: L 6mls w/o NS + 4mls w/ nipple shield R 10mls w/ NS    Total: 20mls  Milk Transfer at this feeding:   Effective breastfeeding for the amount of milk available  Pumped: Type of Pump: Hospital grade  (Suction at 35%)  Quantity Pumped:  Total 5mls  Initiation of Feeding: Infant initiates  Position of Feeding:    Right: football  Left: football  Attachment Achieved: rapidly, with nipple shield   Nipple shield:  Size:  24mm       Introduced IP  Latch achieved, Yes  Suck Pattern at the breast: Chewing but when removing milk has a better sucking pattern with his cheeks.  Suck Pattern on the bottle: Suck, burst normal rest with paced bottle feeding   Behavior Following Observed Feeding: content and sleeping after 1.5oz formula offered   Nipple Pain from: High vacuum causing nipple strain resulting in damage along with creasing of the nipple     Latch: Assisted latch  Suckling/Feeding: attaches, baby fed effectively, baby roots, elicits RAINA and rhythmic  Milk Supply Available: Delayed onset / Low   Low milk supply:   Likely due to: delay in lactogenesis II, ineffective or infrequent breast stimulation or milk removal and possible breast hypoplasia    Diagnosis/Problem  Patient disorder, postpartum condition delayed onset  Sore nipples due to lactation      Discussed concerns and symptoms as listed above in assessment and guidance summarized below.  Topics reviewed included:  • Triple feeding and its sustainability and its impact on the mother baby relationship  • Maternal Mental Health: Discussed the feelings of grief and frustration that can come with supplementing with formula.   • Sleep or lack of and strategies to manage restorative sleep, although short amounts, significant to the mental health of the mother.   o Dad plans to take one feeding to allow one longer stretch of sleep for mom  • Self Care. Support, rest, getting out of the house for fresh air.  • Milk supply is dependent on how many times the baby removes milk and how well the breasts are emptied in a 24 hour period. This is a biological reality that we can't work around. If, for any reason, your baby is not latching, or you are not able to nurse, then it is important for you to remove the milk instead by pumping or hand expression.  There's no magic trick, tea, cookie or supplement that will maintain your milk supply. One  must  effectively remove milk to continue to  make and maximize milk. In the early days and weeks that can be 8+ times in 24 hours. For older babies, on average 6-7 + times in 24 hours.    •  Low Milk Supply: Causes  o Wide spacing,  o Lack of nipple/breast stimulation in the first 36 hours (Baby at the breast but not suckling, mostly non nutritive sucking)  o Lack of breast emptying (Baby at the breast but no removing much milk)  • Feeding: Feed your baby every 1.5-3 hours, more often if baby acts hungry. Awaken baby for feeding if going over 3 hours in the day. ONE four hour at night is acceptable if has had 8 prior feedings in 24 hours.  Need to get in 8-12 feedings per 24 hours.   o Supplement:   - Offer 1-1.5oz of pumped milk/formula after every feeding  - If giving the bottle in place of the breast offer 2oz    o give Expressed Breast Milk first before using formula with paced bottle feeding  ? Paced Bottle Feeding Video: https://www.[employer].com/watch?v=NkBZU7aEW5C  • Positioning Techniques for bare breast  o Suggested positions Cross cradle right, Football left  o Fine tune position by making sure your fingers beneath the breast as well as your bra, are out of the way of your baby's chin.  o Positioning:  Many positions shown, great sidelying at 7 minutes.   o See http://globalhealthmedia.org/portfolio-items/positions-for-breastfeeding/?jekrubsyiWB=69937  • Latch on Techniques for bare breast, modify for nipple shield use  o Fine tune latch:  - By holding your baby more securely at the breast, assisting your baby to stay attached by:  - Bringing your baby to your breast, not breast to the baby  - Your baby's cheek to touch breast securely, nose tipped back  - Hold your baby firmly in place so when your baby forgets to suck and picks it back up again your baby is in the correct spot. You will be extinguishing behavior and replacing it with a deeper latch to stimulate suck and provide satisfaction at the breast  - Your baby needs as much breast as deep in  "the mouth as possible to allow your nipples to heal and for you more importantly to maximize efficiency at the breast  - Latch is asymmetrical, leading with the chin, getting more underneath.  • Nipple shield: 24mm Medela, before applying, roll shield in on itself and allow breast to be pulled  in to the tip.   • Triple feeding: A short term solution: Breast/bottle/pump:  o FIRST decide what you can do at that feeding and you may decide to double feed -pump and bottle, if you are going to offer the breast at that feeding:  - nurse first and limit time on the breasts to 20+/- min total  - finish with bottle  - pump afterwards to finish emptying your breasts which will help increase milk supply  - use your own pumped milk, formula or donor human milk  - 30gm or ml = 1oz  •  Pumping guidelines:  o Both breasts using \"Hands-on Pumping\" for 10-15 minutes to stimulate milk production. See video at : [email or web address].[facility]/Breastfeeding/MaxProduction.html.  o Pumping is effective but can quickly exhaust and overwhelm a new mother  o Pump 6 times in 24 hours after a feeding  o Type of pump:  - Hospital grade  - http://www.[place].com/healthcare-professionals/videos/ameda-platinum-with-hygienikit-video  - Always double pump  - Has Medela at home  o How long will vary woman to woman, typically 8-15 minutes, or 1-2 minutes after flow slows  o Flange Fit: Freely moving nipple in the tunnel with some movement of the areola.  - Today's evaluation indicates appropriate flange  • Increasing supply besides Galactogogues and Pumping: Warmth, Relaxation , Physical, auditory narratives, Childbirth relaxation Techniques, Acupuncture and acupressure, Shoulder Massages and Take a nap  • Connect with other mothers:  o Facebook:   - [place] Breast[facility]: [email or web address].[email or web address]  - Well-Nourished Babies (Private group for questions and support): https://www.facebook.com/groups/734123611098263/  o Breastfeeding [place] " for support not assessment: Tuesday by Zojosé miguel, email invitation will be sent.  - SVETLANA Garcia, IBCLC  and Paula Tucker, IBCLC generally facilitate Tuesday Breastfeeding [place]    • Nipple care:  o May apply breastmilk  o Moist-oily ointment after feeding/pumping, ie Lanolin nipple butter, coconut or olive oil, if desired/needed 2-3 times/day until nipples are healed  o You do not need to wash this off before pumping or feeding the baby  o Soft shells recommended       Mom expressed understanding, and asked appropriate questions.    Summary: Kenya has been diligently breastfeeding and pumping since previous appointment. At this time there has not been an increase in her milk production. Kurt has gained 1.2oz in 4 days indicating that formula supplementation is necessary at this time. He is efficient at removing milk from the breast so pumping is being reduced from 8x to 6x every 24 hours.     Follow-up for infant weight check and dyad breastfeeding evaluation in 7 day(s)  Please call [phone] if you have not scheduled your next appointment    A total of 60 minutes were spent face to face with more than 50% spent counseling and coordinating care as detailed in the above note.      Paula Tucker

## 2020-05-29 ENCOUNTER — GYNECOLOGY VISIT (OUTPATIENT)
Dept: OBGYN | Facility: CLINIC | Age: 30
End: 2020-05-29
Payer: COMMERCIAL

## 2020-05-29 DIAGNOSIS — O92.79 LACTATION DISORDER, POSTPARTUM CONDITION: ICD-10-CM

## 2020-05-29 PROCEDURE — 99215 OFFICE O/P EST HI 40 MIN: CPT | Performed by: NURSE PRACTITIONER

## 2020-05-29 NOTE — PROGRESS NOTES
Subjective:     Kenyacely Estrada is a 30 y.o. female here to establish lactation care with me. She is here today with baby, Kurt and , Jaylen.    Concerns: Feeding evaluation and weight check        HPI:   Pertinent  history:  with vacuum suction  Mother does not have a history of advanced maternal age, GDM, hypertension prior to pregnancy, insulin resistance, multiple gestation, PCOS and thyroid disease. These are common conditions which may interfere in establishing milk supply.  Other risk factors: Baby bae positive     Breast changes in pregnancy: Yes  History of breast surgeries: No      FEEDING HISTORY:    Past breastfeeding history: First baby   Hospital course: Baby did not latch in the first 36 hours, pumping initiated. Latched with nipple shield. Attempted latching before discharge, successful without the nipple shield. Discharge plan was to breastfeed when baby shows cues, pump or hand express 8x/24 hours, feeding back all expressed milk.    Prior to appointment on 2020: Offering the breast then giving pumped milk via syringe, 2mls increments. Pumping after feeding yielding up to 1oz between both breast.  Prior to appointment on 2020: Feeding upwards of 10x every 24 hours, offering the breast for 15-30 minutes. If Kurt still seems hungry mom offers the breast again. Pumping after most feedings yielding 5-8mls total. If pumping in place of feeding, yielding 12mls total. Offered 1oz of formula last night because he would not calm down. Slept for 2 hours, usually only sleeps for 15-20 minutes.   Currently 2020: Feeding every 2-3 hours day and night. Offering the breast each time, 10-15 minutes each side then following with 1.5-2oz of expressed milk and formula. Pumping after all feedings during the day, no pumping at night.   Both breasts: Yes   Bottle feeds: 8/24h    Supplement: Supplementation initiated inpatient, Expressed breast milk and Formula    Quantity: 45-60mls   How given/devices: Bottle    Nipple Shield Use: 24 mm  Recommended by: [facility]  When started: In the hospital     Breast Pumping:   Frequency: Every 2-3 hours, after all daytime feedings (5-6x/24 hours)  Type of pump: HGP  Flange size/type: 25mm  NO pain with pumping  Yielding approximately 10-15mls between both breast, parents report varying amounts throughout the day    ROS:  Constitutional: no fever, chills. Feeling well  Extremities Swelling: No extremity swelling  Gastrointestinal: Negative for nausea, vomiting, constipation  Breasts: No soreness of breasts and Soreness of nipples  Psychiatric: No mood changes and Managing ok  Mental Health: No mention of feeling irritable, agitated, angry, overwhelmed, apathy, exhaustion nor having sleep changes outside infant feeds/demands or appetite changes       Objective:     General: no acute distress  Neurological:  Alert and oriented x3  Breasts: Symetrical, possible wide spacing, dense, Plugged Duct - no evidence and Mastitis - no S/S, mother reports lumps in both armpits, started during pregnancy. No worse or better since delivery. Suggest she call OB to discuss.   Nipples: Intact, tender   Psychiatric: Normal mood and affect. Her behavior is normal. Judgment and thought content normal   Mental Health: Did NOT exhibit sadness, crying, feeling overwhelmed, agitation or hypervigilance.     Assessment/Plan & Lactation Counseling:     Infant Weight History:   05/13/2020: 6# 14.6oz  05/18/2020: 6# 5oz (Dr. Dorado)  05/18/2020: 6# 4.7oz (10mls L / 10mls R / 3mls P)  05/20/2020: 6# 8oz (Dr. Dorado)  05/22/2020: 6# 5.9oz  05/29/2020: 7# 0.4oz  Infant intake at Breast:: L 14mls R 16mls   Total: 30mls  Milk Transfer at this feeding:   Effective breastfeeding for the amount of milk available  Initiation of Feeding: Infant initiates, slightly frantic, which parents report he does at home as well  Position of Feeding:     Right: cross cradle   Left: cross cradle  Attachment Achieved: rapidly, with nipple shield   Nipple shield:  Size: 24mm       Introduced IP  Latch achieved, Yes  Suck Pattern at the breast:Suck, burst normal rest  Suck Pattern on the bottle: Suck, burst normal rest with paced bottle feeding   Behavior Following Observed Feeding: content and sleeping after 1.5oz formula offered   Nipple Pain from: Resolved   Latch: Mother latches independently   Suckling/Feeding: attaches, baby fed effectively, baby roots, elicits RAINA and rhythmic  Milk Supply Available: Low   Low milk supply:   Likely due to: delay in lactogenesis II, ineffective or infrequent breast stimulation or milk removal and possible breast hypoplasia    Diagnosis/Problem  Lactation disorder, managing breastfeeding      Discussed concerns and symptoms as listed above in assessment and guidance summarized below.  Topics reviewed included:  • Triple feeding and its sustainability and its impact on the mother baby relationship  • Self Care. Support, rest, getting out of the house for fresh air.  • Feeding: Feed your baby every 1.5-3 hours, more often if baby acts hungry. Awaken baby for feeding if going over 3 hours in the day. ONE four hour at night is acceptable if has had 8 prior feedings in 24 hours.  Need to get in 8-12 feedings per 24 hours.   o Supplement:   - Offer 1.5-2oz of pumped milk/formula after every feeding  - If giving the bottle in place of the breast offer 2oz    o Give Expressed Breast Milk first before using formula with paced bottle feeding  ? Paced Bottle Feeding Video: https://www.youtube.com/watch?v=[email or web address]  • Pumping guidelines:  o Weaning off: Start pumping after every other feeding, wait several days then cut back to 1-2x every 24 hours.   • Connect with other mothers:  o Facebook:   - [facility]: [email or web address]  - [facility]ourished Babies (Private group for questions and support):  [email or web address]/[email or web address]  o [facility]feeding [place] for support not assessment: Tuesday by Zoom, email invitation will be sent.  - SVETLANA Garcia, IBCLC  and Paula Tucker, IBCLC generally facilitate Tuesday Breastfeeding [place]         Mom expressed understanding, and asked appropriate questions.    Summary: Kenya has stopped pumping during the night but continued with the daytime pumping after all feedings. She offers both breasts at every feeding and follows with 1.5-2oz of formula. Kurt has gained 10.5oz in 7 days. At this time the plan is to cut back on pumping. Continue to breastfeed and top off with bottles after every feeding.      Follow-up for infant weight check and dyad breastfeeding evaluation as needed.   Please call [phone] if you have not scheduled your next appointment    A total of 65 minutes were spent face to face with more than 50% spent counseling and coordinating care as detailed in the above note.      Paula Tucker

## 2020-06-01 PROBLEM — O92.29 SORE NIPPLES DUE TO LACTATION: Status: RESOLVED | Noted: 2020-05-18 | Resolved: 2020-06-01

## 2022-06-01 ENCOUNTER — OFFICE VISIT (OUTPATIENT)
Dept: URGENT CARE | Facility: PHYSICIAN GROUP | Age: 32
End: 2022-06-01
Payer: COMMERCIAL

## 2022-06-01 VITALS
DIASTOLIC BLOOD PRESSURE: 78 MMHG | BODY MASS INDEX: 25.49 KG/M2 | HEIGHT: 61 IN | TEMPERATURE: 98.3 F | RESPIRATION RATE: 12 BRPM | WEIGHT: 135 LBS | SYSTOLIC BLOOD PRESSURE: 140 MMHG | OXYGEN SATURATION: 97 % | HEART RATE: 80 BPM

## 2022-06-01 DIAGNOSIS — R05.9 COUGH: ICD-10-CM

## 2022-06-01 DIAGNOSIS — H10.33 ACUTE CONJUNCTIVITIS OF BOTH EYES, UNSPECIFIED ACUTE CONJUNCTIVITIS TYPE: ICD-10-CM

## 2022-06-01 DIAGNOSIS — J02.9 PHARYNGITIS, UNSPECIFIED ETIOLOGY: ICD-10-CM

## 2022-06-01 LAB
EXTERNAL QUALITY CONTROL: NORMAL
INT CON NEG: NORMAL
INT CON POS: NORMAL
S PYO AG THROAT QL: NEGATIVE
SARS-COV+SARS-COV-2 AG RESP QL IA.RAPID: NEGATIVE

## 2022-06-01 PROCEDURE — 99203 OFFICE O/P NEW LOW 30 MIN: CPT | Mod: CS | Performed by: FAMILY MEDICINE

## 2022-06-01 PROCEDURE — 87426 SARSCOV CORONAVIRUS AG IA: CPT | Performed by: FAMILY MEDICINE

## 2022-06-01 PROCEDURE — 87880 STREP A ASSAY W/OPTIC: CPT | Performed by: FAMILY MEDICINE

## 2022-06-01 RX ORDER — BENZONATATE 200 MG/1
200 CAPSULE ORAL 3 TIMES DAILY PRN
Qty: 30 CAPSULE | Refills: 0 | Status: SHIPPED | OUTPATIENT
Start: 2022-06-01

## 2022-06-01 RX ORDER — OFLOXACIN 3 MG/ML
1 SOLUTION/ DROPS OPHTHALMIC EVERY 4 HOURS
Qty: 5 ML | Refills: 0 | Status: SHIPPED | OUTPATIENT
Start: 2022-06-01 | End: 2022-06-08

## 2022-06-01 RX ORDER — LIDOCAINE HYDROCHLORIDE 20 MG/ML
15 SOLUTION OROPHARYNGEAL EVERY 4 HOURS PRN
Qty: 120 ML | Refills: 0 | Status: SHIPPED | OUTPATIENT
Start: 2022-06-01

## 2022-06-01 ASSESSMENT — ENCOUNTER SYMPTOMS
VOMITING: 0
NAUSEA: 0
WEIGHT LOSS: 0
MYALGIAS: 0

## 2022-06-01 NOTE — LETTER
June 1, 2022         Patient: Kenya Estrada   YOB: 1990   Date of Visit: 6/1/2022           To Whom it May Concern:    Kenya Estrada was seen in my clinic on 6/1/2022. Please excuse from work 6/1 and 6/2/2022.      Sincerely,           Jamal Ramos M.D.  Electronically Signed

## 2022-06-01 NOTE — PROGRESS NOTES
"Subjective     Kenyacely Estrada is a 32 y.o. female who presents with Pharyngitis (Started Sunday Got worse last night ), Conjunctivitis (Started Saturday), and Coronavirus Screening (Exposure to covid , only symptom is sore throat)            4 days sore throat.  Productive cough without blood in sputum.  No shortness of breath or wheezing.  Bilateral eyes are red and draining. She wears contact lenses.  No vision loss.  No foreign body or trauma.  No fever.  No nasal congestion.  COVID-19 exposure.  No loss of taste or smell.  No shortness of breath.  No PMH prior C 19 infection.  No other aggravating or alleviating factors.      Review of Systems   Constitutional: Negative for malaise/fatigue and weight loss.   Gastrointestinal: Negative for nausea and vomiting.   Musculoskeletal: Negative for joint pain and myalgias.   Skin: Negative for itching and rash.              Objective     BP (!) 140/78 (BP Location: Left arm, Patient Position: Sitting, BP Cuff Size: Adult)   Pulse 80   Temp 36.8 °C (98.3 °F) (Temporal)   Resp 12   Ht 1.549 m (5' 1\")   Wt 61.2 kg (135 lb)   SpO2 97%   BMI 25.51 kg/m²      Physical Exam  Constitutional:       General: She is not in acute distress.     Appearance: She is well-developed.   HENT:      Head: Normocephalic and atraumatic.      Right Ear: Tympanic membrane normal.      Left Ear: Tympanic membrane normal.      Nose: Congestion present.      Mouth/Throat:      Mouth: Mucous membranes are moist.      Pharynx: Posterior oropharyngeal erythema present.   Eyes:      Extraocular Movements: Extraocular movements intact.      Pupils: Pupils are equal, round, and reactive to light.      Comments: B conjunctiva injected with moderate exudate. No foreign body. No gross corneal lesion.     Cardiovascular:      Rate and Rhythm: Normal rate and regular rhythm.      Heart sounds: Normal heart sounds. No murmur heard.  Pulmonary:      Effort: Pulmonary effort is normal.      Breath " sounds: Normal breath sounds. No wheezing.   Skin:     General: Skin is warm and dry.      Findings: No rash.   Neurological:      Mental Status: She is alert.                             Assessment & Plan       poct strep negative  poct covid negative    1. Acute conjunctivitis of both eyes, unspecified acute conjunctivitis type  ofloxacin (OCUFLOX) 0.3 % Solution   2. Cough  POCT SARS-COV Antigen SOPHIE (Symptomatic only)    POCT Rapid Strep A    benzonatate (TESSALON) 200 MG capsule   3. Pharyngitis, unspecified etiology  POCT SARS-COV Antigen SOPHIE (Symptomatic only)    POCT Rapid Strep A    lidocaine (XYLOCAINE) 2 % Solution       Differential diagnosis, natural history, supportive care, and indications for immediate follow-up discussed at length.

## 2024-08-15 ENCOUNTER — OFFICE VISIT (OUTPATIENT)
Dept: URGENT CARE | Facility: PHYSICIAN GROUP | Age: 34
End: 2024-08-15
Payer: COMMERCIAL

## 2024-08-15 VITALS
SYSTOLIC BLOOD PRESSURE: 124 MMHG | HEIGHT: 61 IN | WEIGHT: 150 LBS | TEMPERATURE: 97.9 F | DIASTOLIC BLOOD PRESSURE: 80 MMHG | OXYGEN SATURATION: 98 % | HEART RATE: 73 BPM | RESPIRATION RATE: 16 BRPM | BODY MASS INDEX: 28.32 KG/M2

## 2024-08-15 DIAGNOSIS — K21.9 GASTROESOPHAGEAL REFLUX DISEASE, UNSPECIFIED WHETHER ESOPHAGITIS PRESENT: ICD-10-CM

## 2024-08-15 PROCEDURE — 3074F SYST BP LT 130 MM HG: CPT | Performed by: FAMILY MEDICINE

## 2024-08-15 PROCEDURE — 99213 OFFICE O/P EST LOW 20 MIN: CPT | Performed by: FAMILY MEDICINE

## 2024-08-15 PROCEDURE — 3079F DIAST BP 80-89 MM HG: CPT | Performed by: FAMILY MEDICINE

## 2024-08-16 NOTE — PROGRESS NOTES
"  Subjective:      34 y.o. female presents to urgent care for concerns of possible stomach ulcer.  For the last 3 weeks she has been experiencing intermittent epigastric pain, the pain comes after eating, is described as feeling sharp and bloating, currently rated 4/10.  She has tried Pepto-Bismol and Tums with only temporary relief in symptoms.  She has associated nausea without any vomiting.  Bowel movements are regular.  She denies prior abdominal surgery.    3 weeks epi gastric sharp and bloating 4/pepto tums     N no v       She denies any other questions or concerns at this time.    Current problem list, medication, and past medical/surgical history were reviewed in Epic.    ROS  See HPI     Objective:      /80 (BP Location: Left arm, Patient Position: Sitting, BP Cuff Size: Adult)   Pulse 73   Temp 36.6 °C (97.9 °F) (Temporal)   Resp 16   Ht 1.549 m (5' 1\")   Wt 68 kg (150 lb)   SpO2 98%   BMI 28.34 kg/m²     Physical Exam  Constitutional:       General: She is not in acute distress.     Appearance: She is not diaphoretic.   Cardiovascular:      Rate and Rhythm: Normal rate and regular rhythm.      Heart sounds: Normal heart sounds.   Pulmonary:      Effort: Pulmonary effort is normal. No respiratory distress.      Breath sounds: Normal breath sounds.   Abdominal:      Tenderness: There is abdominal tenderness (epi-gastric region).   Neurological:      Mental Status: She is alert.   Psychiatric:         Mood and Affect: Affect normal.         Judgment: Judgment normal.       Assessment/Plan:     1. Gastroesophageal reflux disease, unspecified whether esophagitis present  Patient was encouraged to trial Prilosec 2 times daily for 1 week and then back down to once daily.  Avoid spicy, acidic, and fatty foods.  Referral to establish care with PCP has been placed.  - Referral to establish with PCP      Instructed to return to Urgent Care or nearest Emergency Department if symptoms fail to improve, " for any change in condition, further concerns, or new concerning symptoms. Patient states understanding of the plan of care and discharge instructions.    April Luz M.D.

## 2024-08-16 NOTE — PATIENT INSTRUCTIONS
Please take Prilosec 20 mg two times daily for one week. Then take one tablet (20 mg) in the morning)

## 2024-12-03 ENCOUNTER — TELEPHONE (OUTPATIENT)
Dept: HEALTH INFORMATION MANAGEMENT | Facility: OTHER | Age: 34
End: 2024-12-03
Payer: COMMERCIAL

## 2024-12-05 ENCOUNTER — PATIENT MESSAGE (OUTPATIENT)
Dept: SCHEDULING | Facility: IMAGING CENTER | Age: 34
End: 2024-12-05
Payer: COMMERCIAL

## 2025-10-06 ENCOUNTER — APPOINTMENT (OUTPATIENT)
Dept: MEDICAL GROUP | Facility: PHYSICIAN GROUP | Age: 35
End: 2025-10-06
Payer: COMMERCIAL